# Patient Record
Sex: FEMALE | Race: WHITE
[De-identification: names, ages, dates, MRNs, and addresses within clinical notes are randomized per-mention and may not be internally consistent; named-entity substitution may affect disease eponyms.]

---

## 2017-06-07 ENCOUNTER — HOSPITAL ENCOUNTER (OUTPATIENT)
Dept: HOSPITAL 83 - CARD | Age: 67
Discharge: HOME | End: 2017-06-07
Attending: INTERNAL MEDICINE
Payer: COMMERCIAL

## 2017-06-07 DIAGNOSIS — R06.09: Primary | ICD-10-CM

## 2018-03-23 LAB
LV EF: 83 %
LVEF MODALITY: NORMAL

## 2018-06-19 ENCOUNTER — OFFICE VISIT (OUTPATIENT)
Dept: CARDIOLOGY CLINIC | Age: 68
End: 2018-06-19
Payer: MEDICARE

## 2018-06-19 VITALS
WEIGHT: 189 LBS | HEART RATE: 51 BPM | RESPIRATION RATE: 14 BRPM | BODY MASS INDEX: 29.66 KG/M2 | SYSTOLIC BLOOD PRESSURE: 120 MMHG | DIASTOLIC BLOOD PRESSURE: 68 MMHG | HEIGHT: 67 IN

## 2018-06-19 DIAGNOSIS — I49.5 SICK SINUS SYNDROME (HCC): ICD-10-CM

## 2018-06-19 DIAGNOSIS — I48.21 PERMANENT ATRIAL FIBRILLATION (HCC): Primary | ICD-10-CM

## 2018-06-19 PROCEDURE — 1036F TOBACCO NON-USER: CPT | Performed by: INTERNAL MEDICINE

## 2018-06-19 PROCEDURE — G8400 PT W/DXA NO RESULTS DOC: HCPCS | Performed by: INTERNAL MEDICINE

## 2018-06-19 PROCEDURE — G8419 CALC BMI OUT NRM PARAM NOF/U: HCPCS | Performed by: INTERNAL MEDICINE

## 2018-06-19 PROCEDURE — G8427 DOCREV CUR MEDS BY ELIG CLIN: HCPCS | Performed by: INTERNAL MEDICINE

## 2018-06-19 PROCEDURE — 1123F ACP DISCUSS/DSCN MKR DOCD: CPT | Performed by: INTERNAL MEDICINE

## 2018-06-19 PROCEDURE — 3017F COLORECTAL CA SCREEN DOC REV: CPT | Performed by: INTERNAL MEDICINE

## 2018-06-19 PROCEDURE — 93000 ELECTROCARDIOGRAM COMPLETE: CPT | Performed by: INTERNAL MEDICINE

## 2018-06-19 PROCEDURE — 99214 OFFICE O/P EST MOD 30 MIN: CPT | Performed by: INTERNAL MEDICINE

## 2018-06-19 PROCEDURE — 4040F PNEUMOC VAC/ADMIN/RCVD: CPT | Performed by: INTERNAL MEDICINE

## 2018-06-19 PROCEDURE — 1090F PRES/ABSN URINE INCON ASSESS: CPT | Performed by: INTERNAL MEDICINE

## 2018-06-19 RX ORDER — ATORVASTATIN CALCIUM 10 MG/1
10 TABLET, FILM COATED ORAL EVERY MORNING
COMMUNITY
Start: 2018-06-18 | End: 2018-12-06 | Stop reason: ALTCHOICE

## 2018-06-19 RX ORDER — OMEPRAZOLE 40 MG/1
40 CAPSULE, DELAYED RELEASE ORAL DAILY PRN
COMMUNITY
Start: 2018-06-18

## 2018-06-19 RX ORDER — VENLAFAXINE HYDROCHLORIDE 75 MG/1
75 CAPSULE, EXTENDED RELEASE ORAL EVERY MORNING
COMMUNITY
Start: 2018-06-18

## 2018-06-19 RX ORDER — CYANOCOBALAMIN 1000 UG/ML
1000 INJECTION INTRAMUSCULAR; SUBCUTANEOUS
COMMUNITY
Start: 2018-06-05

## 2018-06-19 RX ORDER — FLUOXETINE HYDROCHLORIDE 40 MG/1
40 CAPSULE ORAL EVERY MORNING
COMMUNITY
Start: 2018-06-18

## 2018-07-12 ENCOUNTER — TELEPHONE (OUTPATIENT)
Dept: ADMINISTRATIVE | Age: 68
End: 2018-07-12

## 2018-07-12 ENCOUNTER — HOSPITAL ENCOUNTER (INPATIENT)
Age: 68
LOS: 2 days | Discharge: HOME | DRG: 244 | End: 2018-07-14
Attending: INTERNAL MEDICINE | Admitting: INTERNAL MEDICINE
Payer: MEDICARE

## 2018-07-12 ENCOUNTER — APPOINTMENT (OUTPATIENT)
Dept: MRI IMAGING | Age: 68
DRG: 244 | End: 2018-07-12
Attending: INTERNAL MEDICINE
Payer: MEDICARE

## 2018-07-12 ENCOUNTER — APPOINTMENT (OUTPATIENT)
Dept: GENERAL RADIOLOGY | Age: 68
End: 2018-07-12
Payer: MEDICARE

## 2018-07-12 ENCOUNTER — HOSPITAL ENCOUNTER (OUTPATIENT)
Age: 68
Discharge: HOME | End: 2018-07-12
Payer: MEDICARE

## 2018-07-12 ENCOUNTER — HOSPITAL ENCOUNTER (EMERGENCY)
Age: 68
Discharge: ANOTHER HOSPITAL | End: 2018-07-12
Attending: EMERGENCY MEDICINE
Payer: MEDICARE

## 2018-07-12 ENCOUNTER — APPOINTMENT (OUTPATIENT)
Dept: CT IMAGING | Age: 68
End: 2018-07-12
Payer: MEDICARE

## 2018-07-12 VITALS
SYSTOLIC BLOOD PRESSURE: 142 MMHG | HEIGHT: 67 IN | TEMPERATURE: 98 F | WEIGHT: 185 LBS | BODY MASS INDEX: 29.03 KG/M2 | OXYGEN SATURATION: 97 % | HEART RATE: 42 BPM | RESPIRATION RATE: 14 BRPM | DIASTOLIC BLOOD PRESSURE: 52 MMHG

## 2018-07-12 DIAGNOSIS — I48.91 ATRIAL FIBRILLATION WITH SLOW VENTRICULAR RESPONSE (HCC): Primary | ICD-10-CM

## 2018-07-12 PROBLEM — I48.21 PERMANENT ATRIAL FIBRILLATION (HCC): Status: RESOLVED | Noted: 2018-06-19 | Resolved: 2018-07-12

## 2018-07-12 PROBLEM — R00.1 SYMPTOMATIC BRADYCARDIA: Status: ACTIVE | Noted: 2018-07-12

## 2018-07-12 PROBLEM — R55 NEAR SYNCOPE: Status: ACTIVE | Noted: 2018-07-12

## 2018-07-12 PROBLEM — I48.19 PERSISTENT ATRIAL FIBRILLATION (HCC): Status: ACTIVE | Noted: 2018-07-12

## 2018-07-12 LAB
ANION GAP SERPL CALCULATED.3IONS-SCNC: 12 MMOL/L (ref 7–16)
APTT: 40.1 SEC (ref 24.5–35.1)
BACTERIA: ABNORMAL /HPF
BILIRUBIN URINE: NEGATIVE
BLOOD, URINE: NORMAL
BUN BLDV-MCNC: 16 MG/DL (ref 8–23)
CALCIUM SERPL-MCNC: 8.8 MG/DL (ref 8.6–10.2)
CHLORIDE BLD-SCNC: 105 MMOL/L (ref 98–107)
CLARITY: CLEAR
CO2: 23 MMOL/L (ref 22–29)
COLOR: YELLOW
CREAT SERPL-MCNC: 0.8 MG/DL (ref 0.5–1)
EKG ATRIAL RATE: 53 BPM
EKG Q-T INTERVAL: 466 MS
EKG QRS DURATION: 92 MS
EKG QTC CALCULATION (BAZETT): 429 MS
EKG R AXIS: 76 DEGREES
EKG T AXIS: 57 DEGREES
EKG VENTRICULAR RATE: 51 BPM
EPITHELIAL CELLS, UA: ABNORMAL /HPF
GFR AFRICAN AMERICAN: >60
GFR NON-AFRICAN AMERICAN: >60 ML/MIN/1.73
GLUCOSE BLD-MCNC: 89 MG/DL (ref 74–109)
GLUCOSE URINE: NEGATIVE MG/DL
HCT VFR BLD CALC: 34.7 % (ref 34–48)
HEMOGLOBIN: 11 G/DL (ref 11.5–15.5)
INR BLD: 1.5
KETONES, URINE: NEGATIVE MG/DL
LEUKOCYTE ESTERASE, URINE: NEGATIVE
MCH RBC QN AUTO: 31.7 PG (ref 26–35)
MCHC RBC AUTO-ENTMCNC: 31.7 % (ref 32–34.5)
MCV RBC AUTO: 100 FL (ref 80–99.9)
NITRITE, URINE: NEGATIVE
PDW BLD-RTO: 14.1 FL (ref 11.5–15)
PH UA: 5.5 (ref 5–9)
PLATELET # BLD: 305 E9/L (ref 130–450)
PMV BLD AUTO: 9.9 FL (ref 7–12)
POTASSIUM SERPL-SCNC: 4.5 MMOL/L (ref 3.5–5)
PROTEIN UA: NEGATIVE MG/DL
PROTHROMBIN TIME: 16.9 SEC (ref 9.3–12.4)
RBC # BLD: 3.47 E12/L (ref 3.5–5.5)
RBC UA: ABNORMAL /HPF (ref 0–2)
SODIUM BLD-SCNC: 140 MMOL/L (ref 132–146)
SPECIFIC GRAVITY UA: 1.01 (ref 1–1.03)
TROPONIN: <0.01 NG/ML (ref 0–0.03)
UROBILINOGEN, URINE: 0.2 E.U./DL
WBC # BLD: 14.5 E9/L (ref 4.5–11.5)
WBC UA: ABNORMAL /HPF (ref 0–5)

## 2018-07-12 PROCEDURE — 99285 EMERGENCY DEPT VISIT HI MDM: CPT

## 2018-07-12 PROCEDURE — 80048 BASIC METABOLIC PNL TOTAL CA: CPT

## 2018-07-12 PROCEDURE — 70450 CT HEAD/BRAIN W/O DYE: CPT

## 2018-07-12 PROCEDURE — 2140000000 HC CCU INTERMEDIATE R&B

## 2018-07-12 PROCEDURE — 85027 COMPLETE CBC AUTOMATED: CPT

## 2018-07-12 PROCEDURE — 2580000003 HC RX 258: Performed by: NURSE PRACTITIONER

## 2018-07-12 PROCEDURE — 81001 URINALYSIS AUTO W/SCOPE: CPT

## 2018-07-12 PROCEDURE — 84484 ASSAY OF TROPONIN QUANT: CPT

## 2018-07-12 PROCEDURE — A0425 GROUND MILEAGE: HCPCS

## 2018-07-12 PROCEDURE — 70551 MRI BRAIN STEM W/O DYE: CPT

## 2018-07-12 PROCEDURE — 71045 X-RAY EXAM CHEST 1 VIEW: CPT

## 2018-07-12 PROCEDURE — 85610 PROTHROMBIN TIME: CPT

## 2018-07-12 PROCEDURE — 6370000000 HC RX 637 (ALT 250 FOR IP): Performed by: INTERNAL MEDICINE

## 2018-07-12 PROCEDURE — 99223 1ST HOSP IP/OBS HIGH 75: CPT | Performed by: INTERNAL MEDICINE

## 2018-07-12 PROCEDURE — 85730 THROMBOPLASTIN TIME PARTIAL: CPT

## 2018-07-12 PROCEDURE — A0426 ALS 1: HCPCS

## 2018-07-12 PROCEDURE — 93005 ELECTROCARDIOGRAM TRACING: CPT | Performed by: PHYSICIAN ASSISTANT

## 2018-07-12 RX ORDER — FLUOXETINE HYDROCHLORIDE 20 MG/1
40 CAPSULE ORAL EVERY MORNING
Status: DISCONTINUED | OUTPATIENT
Start: 2018-07-13 | End: 2018-07-14 | Stop reason: HOSPADM

## 2018-07-12 RX ORDER — ONDANSETRON 2 MG/ML
4 INJECTION INTRAMUSCULAR; INTRAVENOUS EVERY 6 HOURS PRN
Status: DISCONTINUED | OUTPATIENT
Start: 2018-07-12 | End: 2018-07-14 | Stop reason: HOSPADM

## 2018-07-12 RX ORDER — SODIUM CHLORIDE 0.9 % (FLUSH) 0.9 %
10 SYRINGE (ML) INJECTION PRN
Status: DISCONTINUED | OUTPATIENT
Start: 2018-07-12 | End: 2018-07-14 | Stop reason: HOSPADM

## 2018-07-12 RX ORDER — SODIUM CHLORIDE 0.9 % (FLUSH) 0.9 %
10 SYRINGE (ML) INJECTION EVERY 12 HOURS SCHEDULED
Status: DISCONTINUED | OUTPATIENT
Start: 2018-07-12 | End: 2018-07-14 | Stop reason: HOSPADM

## 2018-07-12 RX ORDER — ATORVASTATIN CALCIUM 10 MG/1
10 TABLET, FILM COATED ORAL EVERY MORNING
Status: DISCONTINUED | OUTPATIENT
Start: 2018-07-13 | End: 2018-07-14 | Stop reason: HOSPADM

## 2018-07-12 RX ORDER — PANTOPRAZOLE SODIUM 40 MG/1
40 TABLET, DELAYED RELEASE ORAL
Status: DISCONTINUED | OUTPATIENT
Start: 2018-07-13 | End: 2018-07-14 | Stop reason: HOSPADM

## 2018-07-12 RX ORDER — 0.9 % SODIUM CHLORIDE 0.9 %
10 VIAL (ML) INJECTION PRN
Status: DISCONTINUED | OUTPATIENT
Start: 2018-07-12 | End: 2018-07-12 | Stop reason: HOSPADM

## 2018-07-12 RX ORDER — LORAZEPAM 1 MG/1
1 TABLET ORAL EVERY MORNING
Status: DISCONTINUED | OUTPATIENT
Start: 2018-07-13 | End: 2018-07-14 | Stop reason: HOSPADM

## 2018-07-12 RX ORDER — VENLAFAXINE HYDROCHLORIDE 75 MG/1
75 CAPSULE, EXTENDED RELEASE ORAL EVERY MORNING
Status: DISCONTINUED | OUTPATIENT
Start: 2018-07-13 | End: 2018-07-14 | Stop reason: HOSPADM

## 2018-07-12 RX ORDER — SODIUM CHLORIDE 0.9 % (FLUSH) 0.9 %
SYRINGE (ML) INJECTION
Status: DISCONTINUED
Start: 2018-07-12 | End: 2018-07-12 | Stop reason: HOSPADM

## 2018-07-12 RX ORDER — PREDNISONE 20 MG/1
20 TABLET ORAL EVERY 6 HOURS
Status: DISCONTINUED | OUTPATIENT
Start: 2018-07-12 | End: 2018-07-13

## 2018-07-12 RX ORDER — LORAZEPAM 1 MG/1
1 TABLET ORAL EVERY MORNING
COMMUNITY

## 2018-07-12 RX ADMIN — Medication 10 ML: at 20:55

## 2018-07-12 RX ADMIN — PREDNISONE 20 MG: 20 TABLET ORAL at 16:06

## 2018-07-12 RX ADMIN — PREDNISONE 20 MG: 20 TABLET ORAL at 21:33

## 2018-07-12 ASSESSMENT — PAIN SCALES - GENERAL
PAINLEVEL_OUTOF10: 0
PAINLEVEL_OUTOF10: 0

## 2018-07-12 NOTE — CARE COORDINATION
Care Coordination:  Lives at home with . Is independent with ADLs, driving , etc.  No hx of DME, HHC or [facility]. She is not receptive to [facility] and feels she has no needs. No concerns regarding pharmacy. Has no questions and anticipates no needs.     Will follow    [employer] Messing

## 2018-07-12 NOTE — CONSULTS
TDI       E/Lateral E':  8.3 E/Medial E':  13.0               Pulmonary Valve       PV Peak Ron.:  0.7 m/s PV Peak Gr.:  2.1 mmHg               Tricuspid Valve       TR Peak Ron.:  2.8 m/s RAP Estimate:  8.0 mmHg          TV Max P.0 mmHg               Left Ventricle       The left ventricle is normal size. There is normal LV segmental wall        motion. There is normal left ventricular wall thickness. The left        ventricular systolic function is normal. The left ventricular        ejection fraction is within the normal range. LVEF is 65%. The left        ventricular diastolic function is normal.               Right Ventricle       The right ventricle is normal size. TAPSE 23 mm The right ventricular        systolic function is normal.          PAGE 2               Signed Report                     (CONTINUED)                                            Name: GIBSON GUARDADOCA                                            Phys: AURELIANO CARRILLO DO                                            : 1950 Age: 78      Sex: F                                            Acct: [de-identified] Loc: 405 2                                               Exam Date: 2018 Status: ADM IN                                            Radiology No: [id number]                                            Unit No: [id number]                    EXAM#     TYPE/EXAM                       RESULT                        [id number] ECHO/ECHOCARDIOGRAM COMPLETE    SEE REPORT                           <Continued>                  Atria       Left atrium is mildly dilated. Right atrium is mildly        dilated.               Aortic Valve       The Aortic valve is sclerotic. Aortic valve is trileaflet. No aortic        regurgitation is present. There is no aortic valvular stenosis.              Mitral Valve       The mitral valve is normal in structure. Mild to moderate mitral        regurgitation. There is no mitral valve stenosis.              Time of stress imagin     Date: 2018        Heart Rate at time of stress injection: 77 bpm.        Gated Stress SPECT was performed 45 minutes after stress         injection.        The images were gated to evaluate regional wall motion and calculate         left ventricular ejection fraction.                  Study Quality        Study quality was good.        Artifact: Breast artifact            PAGE 1               Signed Report                     (CONTINUED)                                             Name: JASIEL GUARDADO                                             Phys: EILEEN VELEZ,ALYSE                                             : 1950 Age: 78      Sex:  F                                             Acct: [de-identified] Loc: CARD                                                 Exam Date: 2018 Status: REG CLI                                             Radiology No: [id number]                                             Unit No: [id number]                          EXAM#     TYPE/EXAM                       RESULT                         [id number] NM/NM MYOCARDIAL PERFUSION COMP SEE REPORT                            <Continued>                     Left Ventricle        Size:         The left ventricle is normal size.         Systolic Function:         The left ventricular systolic function is normal.         Wall Motion:         Left ventricular wall motion is normal.                 Nuclear Findings        SSS: 3  SRS: 0  SDS: 3        Gated LVESV: 18        TID Score: 1.09        LVEF by Gated SPECT: 83 %                 Left Ventricle Perfusion        Normal uniformed perfusion on both the stress and resting         images.        Normal left ventricular perfusion.                 Impression        Normal pharmacologic nuclear stress test.                 Conclusion        The stress and resting images show normal perfusion.        Normal study.  No scintigraphic evidence for myocardial ischemia or         scar.                 <Conclusion>        The stress and resting images show normal perfusion.                                            PAGE 2               Signed Report                     (CONTINUED)                                             Name: JASIEL GUARDADO                                             Phys: EILEEN VELEZ,ALYSE                                             : 1950 Age: 78      Sex: Starr Yo                                        Acct: [de-identified] Loc: CARD                                                 Exam Date: 2018 Status: REG CLI                                             Radiology No: [id number]                                             Unit No: [id number]                          EXAM#     TYPE/EXAM                       RESULT                         [id number] NM/NM MYOCARDIAL PERFUSION COMP SEE REPORT                            <Continued>            Normal study.  No scintigraphic evidence for myocardial ischemia or         scar.                                         ** REPORT SIGNED IN OTHER VENDOR SYSTEM 2018 **                       Reported By: Jessica Rodriguez MD                                                                                                                CC: Jessica Rodriguez MD; Reji Dyson MD                     Technologist: Kecia Kessler   Transcribed Date/Time: 2018 (7591)        : [id number]        Printed Date/Time: 2018 (1078)         Telemetry2018: AF with SVR    ECG 2018: AF with a SVR, NAD, normal QRSd, no acute ST changes. I have independently reviewed all of the ECGs and rhythm strips per above. I have personally reviewed the laboratory, cardiac diagnostic and radiographic testing as outlined above. I have reviewed previous records noted in [address]. Impression:    1.  Symptomatic bradycardia/AF with SVR/SSS  - HRs 30s - 40s bpm with associated GU, SOB,

## 2018-07-12 NOTE — ED NOTES
Assumed care of pt. No s/s of distress. Resps easy. A&O. Cardiac/hemodynamic monitoring in place. EKG completed and given to attending. Pt states she was seen last week at [facility] (on Thursday per pt). Pt was diagnosed with AFib with SVR and sent home with a heart monitor from the hospital. Pt states she does not have any results from monitor at this time. Pt c/o jaw pain last night, numbness and slurred speech past couple of days. Pt c/o tingling in arms, legs, and facial cheeks at this time. Pt has appointment with Dr Trevor Elias tomorrow. Pt states at the end of March she was diagnosed with AFib with SVR and started on Xarelto. Visitor in room.      Adelaide Williamson RN  07/12/18 3364

## 2018-07-12 NOTE — H&P
lives Home     TOBACCO:   reports that she quit smoking about 35 years ago. She has a 0.75 pack-year smoking history. She has never used smokeless tobacco.  ETOH:   reports that she does not drink alcohol. REVIEW OF SYSTEMS:   Pertinent positives as noted in the HPI. All other systems reviewed and negative. PHYSICAL EXAM:    BP (!) 142/67   Pulse 63   Temp 97.2 °F (36.2 °C) (Temporal)   Resp 16   Ht 5' 7\" (1.702 m)   Wt 182 lb 12.8 oz (82.9 kg)   SpO2 98%   BMI 28.63 kg/m²     General appearance:  No apparent distress, appears stated age and cooperative. HEENT:  Normal cephalic, atraumatic without obvious deformity. Pupils equal, round, and reactive to light. Extra ocular muscles intact. Conjunctivae/corneas clear. Neck: Supple, with full range of motion. No jugular venous distention. Trachea midline. Respiratory:  Normal respiratory effort. Clear to auscultation, bilaterally without Rales/Wheezes/Rhonchi. Cardiovascular:  Borderline bradycardic and irregular rhythm with normal S1/S2 without murmurs, rubs or gallops. Abdomen: Soft, non-tender, non-distended with normal bowel sounds. Musculoskeletal:  No clubbing, cyanosis or edema bilaterally. Full range of motion without deformity. Skin: Skin color, texture, turgor normal.  No rashes or lesions. Neurologic:  Neurovascularly intact without any focal sensory/motor deficits. Peripheral neuropathy noted with decreased sensation to toes and feet  Psychiatric:  Alert and oriented, thought content appropriate, normal insight  Capillary Refill: Brisk,< 3 seconds   Peripheral Pulses: +2 palpable, equal bilaterally     Ct Head Wo Contrast  Result Date: 7/12/2018  1. No evidence of intracranial hemorrhage, extra axial collection, space-occupying mass lesion or mass effect, midline shift, or acute territorial infarction.  If there is strong clinical suspicion for acute infarct of the brain, then MR imaging of the brain with diffusion-weighted sequence may be obtained for further evaluation. 2. Chronic involutional changes and minimal microvascular ischemic disease as described above. Xr Chest Portable   Result Date: 7/12/2018  No gross focal consolidation. EKG:  Atrial fibrillation rate of 51 BPM     Labs:     Recent Labs      07/12/18 0922   WBC  14.5*   HGB  11.0*   HCT  34.7   PLT  305     Recent Labs      07/12/18 0922   NA  140   K  4.5   CL  105   CO2  23   BUN  16   CREATININE  0.8   CALCIUM  8.8     No results for input(s): AST, ALT, BILIDIR, BILITOT, ALKPHOS in the last 72 hours. Recent Labs      07/12/18 0922   INR  1.5     Recent Labs      07/12/18 0922   TROPONINI  <0.01       Urinalysis:      Lab Results   Component Value Date    NITRU Negative 07/12/2018    WBCUA NONE 07/12/2018    BACTERIA RARE 07/12/2018    RBCUA 1-3 07/12/2018    BLOODU TRACE-INTACT 07/12/2018    SPECGRAV 1.010 07/12/2018    GLUCOSEU Negative 07/12/2018         ASSESSMENT:    Active Hospital Problems    Diagnosis Date Noted    A-fib [facility]) [I48.91] 07/12/2018     Persistent atrial fibrillation with slow ventricular response  - Patient was diagnosed in March 2018 with atrial fibrillation by Dr. Ramya Ghosh and was placed on Xarelto at that time. She states that this was incidentally found due to coughing and some chest tightness. - Echocardiogram and stress test were done at that time. Echo showed EF of 65-70%. Stress test was negative in March 2018  - Patient was not on any rate controlling medications at home; heart rate has been low since diagnosis without meds  - Patient has wore a 48-hour monitor which is scanned into Epic; denies any syncopal events    Symptomatic bradycardia   - Patient describes lightheadedness with unsteady gait at times when she ambulates. States that she checks her heart rate at home and at times it is in the 30s to 40s. Speech changes with unsteady gait  - Will rule out stroke in setting of atrial fibrillation.   Low suspicion for

## 2018-07-12 NOTE — ED NOTES
OK to order meal tray for pt per attending. Meal tray ordered.  Fluids given to pt per pt request.     Giorgio Fox RN  07/12/18 6270

## 2018-07-13 ENCOUNTER — ANESTHESIA (OUTPATIENT)
Dept: CARDIAC CATH/INVASIVE PROCEDURES | Age: 68
DRG: 244 | End: 2018-07-13
Payer: MEDICARE

## 2018-07-13 ENCOUNTER — ANESTHESIA EVENT (OUTPATIENT)
Dept: CARDIAC CATH/INVASIVE PROCEDURES | Age: 68
DRG: 244 | End: 2018-07-13
Payer: MEDICARE

## 2018-07-13 ENCOUNTER — APPOINTMENT (OUTPATIENT)
Dept: GENERAL RADIOLOGY | Age: 68
DRG: 244 | End: 2018-07-13
Attending: INTERNAL MEDICINE
Payer: MEDICARE

## 2018-07-13 VITALS — DIASTOLIC BLOOD PRESSURE: 65 MMHG | OXYGEN SATURATION: 96 % | SYSTOLIC BLOOD PRESSURE: 108 MMHG

## 2018-07-13 PROBLEM — D46.9 MYELODYSPLASTIC SYNDROME (HCC): Chronic | Status: ACTIVE | Noted: 2018-07-13

## 2018-07-13 PROBLEM — Z87.898 HISTORY OF UNSTEADY GAIT: Chronic | Status: ACTIVE | Noted: 2018-07-13

## 2018-07-13 PROBLEM — R29.90 STROKE-LIKE SYMPTOM: Status: ACTIVE | Noted: 2018-07-13

## 2018-07-13 LAB
ANION GAP SERPL CALCULATED.3IONS-SCNC: 12 MMOL/L (ref 7–16)
BUN BLDV-MCNC: 17 MG/DL (ref 8–23)
CALCIUM SERPL-MCNC: 8.7 MG/DL (ref 8.6–10.2)
CHLORIDE BLD-SCNC: 102 MMOL/L (ref 98–107)
CO2: 25 MMOL/L (ref 22–29)
CREAT SERPL-MCNC: 0.8 MG/DL (ref 0.5–1)
GFR AFRICAN AMERICAN: >60
GFR NON-AFRICAN AMERICAN: >60 ML/MIN/1.73
GLUCOSE BLD-MCNC: 142 MG/DL (ref 74–109)
HCT VFR BLD CALC: 34 % (ref 34–48)
HEMOGLOBIN: 11.1 G/DL (ref 11.5–15.5)
MAGNESIUM: 2 MG/DL (ref 1.6–2.6)
MCH RBC QN AUTO: 32.2 PG (ref 26–35)
MCHC RBC AUTO-ENTMCNC: 32.6 % (ref 32–34.5)
MCV RBC AUTO: 98.6 FL (ref 80–99.9)
PDW BLD-RTO: 13.9 FL (ref 11.5–15)
PLATELET # BLD: 290 E9/L (ref 130–450)
PMV BLD AUTO: 9.8 FL (ref 7–12)
POTASSIUM REFLEX MAGNESIUM: 4.2 MMOL/L (ref 3.5–5)
PROCALCITONIN: 0.05 NG/ML (ref 0–0.08)
RBC # BLD: 3.45 E12/L (ref 3.5–5.5)
SODIUM BLD-SCNC: 139 MMOL/L (ref 132–146)
WBC # BLD: 18.9 E9/L (ref 4.5–11.5)

## 2018-07-13 PROCEDURE — 2720000010 HC SURG SUPPLY STERILE

## 2018-07-13 PROCEDURE — 36415 COLL VENOUS BLD VENIPUNCTURE: CPT

## 2018-07-13 PROCEDURE — 84145 PROCALCITONIN (PCT): CPT

## 2018-07-13 PROCEDURE — 02H63JZ INSERTION OF PACEMAKER LEAD INTO RIGHT ATRIUM, PERCUTANEOUS APPROACH: ICD-10-PCS | Performed by: FAMILY MEDICINE

## 2018-07-13 PROCEDURE — 2709999900 HC NON-CHARGEABLE SUPPLY

## 2018-07-13 PROCEDURE — 33208 INSRT HEART PM ATRIAL & VENT: CPT | Performed by: INTERNAL MEDICINE

## 2018-07-13 PROCEDURE — 6360000002 HC RX W HCPCS: Performed by: INTERNAL MEDICINE

## 2018-07-13 PROCEDURE — 6370000000 HC RX 637 (ALT 250 FOR IP): Performed by: INTERNAL MEDICINE

## 2018-07-13 PROCEDURE — 6360000002 HC RX W HCPCS: Performed by: NURSE ANESTHETIST, CERTIFIED REGISTERED

## 2018-07-13 PROCEDURE — 6370000000 HC RX 637 (ALT 250 FOR IP): Performed by: NURSE PRACTITIONER

## 2018-07-13 PROCEDURE — 80048 BASIC METABOLIC PNL TOTAL CA: CPT

## 2018-07-13 PROCEDURE — 6360000002 HC RX W HCPCS

## 2018-07-13 PROCEDURE — C1785 PMKR, DUAL, RATE-RESP: HCPCS

## 2018-07-13 PROCEDURE — 2580000003 HC RX 258: Performed by: INTERNAL MEDICINE

## 2018-07-13 PROCEDURE — 2140000000 HC CCU INTERMEDIATE R&B

## 2018-07-13 PROCEDURE — 85027 COMPLETE CBC AUTOMATED: CPT

## 2018-07-13 PROCEDURE — 83735 ASSAY OF MAGNESIUM: CPT

## 2018-07-13 PROCEDURE — 2500000003 HC RX 250 WO HCPCS

## 2018-07-13 PROCEDURE — 71045 X-RAY EXAM CHEST 1 VIEW: CPT

## 2018-07-13 PROCEDURE — 0JH606Z INSERTION OF PACEMAKER, DUAL CHAMBER INTO CHEST SUBCUTANEOUS TISSUE AND FASCIA, OPEN APPROACH: ICD-10-PCS | Performed by: FAMILY MEDICINE

## 2018-07-13 PROCEDURE — 02HK3JZ INSERTION OF PACEMAKER LEAD INTO RIGHT VENTRICLE, PERCUTANEOUS APPROACH: ICD-10-PCS | Performed by: FAMILY MEDICINE

## 2018-07-13 PROCEDURE — C1894 INTRO/SHEATH, NON-LASER: HCPCS

## 2018-07-13 PROCEDURE — C1898 LEAD, PMKR, OTHER THAN TRANS: HCPCS

## 2018-07-13 PROCEDURE — 2580000003 HC RX 258: Performed by: NURSE ANESTHETIST, CERTIFIED REGISTERED

## 2018-07-13 PROCEDURE — 2580000003 HC RX 258

## 2018-07-13 PROCEDURE — B507YZZ PLAIN RADIOGRAPHY OF LEFT SUBCLAVIAN VEIN USING OTHER CONTRAST: ICD-10-PCS | Performed by: FAMILY MEDICINE

## 2018-07-13 PROCEDURE — 2580000003 HC RX 258: Performed by: NURSE PRACTITIONER

## 2018-07-13 RX ORDER — FENTANYL CITRATE 50 UG/ML
INJECTION, SOLUTION INTRAMUSCULAR; INTRAVENOUS PRN
Status: DISCONTINUED | OUTPATIENT
Start: 2018-07-13 | End: 2018-07-13 | Stop reason: SDUPTHER

## 2018-07-13 RX ORDER — SODIUM CHLORIDE 9 MG/ML
INJECTION, SOLUTION INTRAVENOUS CONTINUOUS PRN
Status: DISCONTINUED | OUTPATIENT
Start: 2018-07-13 | End: 2018-07-13 | Stop reason: SDUPTHER

## 2018-07-13 RX ORDER — PROPOFOL 10 MG/ML
INJECTION, EMULSION INTRAVENOUS CONTINUOUS PRN
Status: DISCONTINUED | OUTPATIENT
Start: 2018-07-13 | End: 2018-07-13 | Stop reason: SDUPTHER

## 2018-07-13 RX ORDER — ACETAMINOPHEN 325 MG/1
650 TABLET ORAL EVERY 4 HOURS PRN
Status: DISCONTINUED | OUTPATIENT
Start: 2018-07-13 | End: 2018-07-14 | Stop reason: HOSPADM

## 2018-07-13 RX ORDER — SODIUM CHLORIDE 0.9 % (FLUSH) 0.9 %
10 SYRINGE (ML) INJECTION PRN
Status: DISCONTINUED | OUTPATIENT
Start: 2018-07-13 | End: 2018-07-14 | Stop reason: HOSPADM

## 2018-07-13 RX ORDER — SODIUM CHLORIDE 0.9 % (FLUSH) 0.9 %
10 SYRINGE (ML) INJECTION EVERY 12 HOURS SCHEDULED
Status: DISCONTINUED | OUTPATIENT
Start: 2018-07-13 | End: 2018-07-14 | Stop reason: HOSPADM

## 2018-07-13 RX ORDER — HYDROCODONE BITARTRATE AND ACETAMINOPHEN 5; 325 MG/1; MG/1
2 TABLET ORAL EVERY 4 HOURS PRN
Status: DISCONTINUED | OUTPATIENT
Start: 2018-07-13 | End: 2018-07-14 | Stop reason: HOSPADM

## 2018-07-13 RX ORDER — MIDAZOLAM HYDROCHLORIDE 1 MG/ML
INJECTION INTRAMUSCULAR; INTRAVENOUS PRN
Status: DISCONTINUED | OUTPATIENT
Start: 2018-07-13 | End: 2018-07-13 | Stop reason: SDUPTHER

## 2018-07-13 RX ORDER — HYDROCODONE BITARTRATE AND ACETAMINOPHEN 5; 325 MG/1; MG/1
1 TABLET ORAL EVERY 4 HOURS PRN
Status: DISCONTINUED | OUTPATIENT
Start: 2018-07-13 | End: 2018-07-14 | Stop reason: HOSPADM

## 2018-07-13 RX ADMIN — FENTANYL CITRATE 50 MCG: 50 INJECTION, SOLUTION INTRAMUSCULAR; INTRAVENOUS at 10:52

## 2018-07-13 RX ADMIN — HYDROCODONE BITARTRATE AND ACETAMINOPHEN 1 TABLET: 5; 325 TABLET ORAL at 13:59

## 2018-07-13 RX ADMIN — HYDROCODONE BITARTRATE AND ACETAMINOPHEN 1 TABLET: 5; 325 TABLET ORAL at 19:00

## 2018-07-13 RX ADMIN — PROPOFOL 50 MCG/KG/MIN: 10 INJECTION, EMULSION INTRAVENOUS at 10:52

## 2018-07-13 RX ADMIN — Medication 10 ML: at 13:54

## 2018-07-13 RX ADMIN — FENTANYL CITRATE 25 MCG: 50 INJECTION, SOLUTION INTRAMUSCULAR; INTRAVENOUS at 11:05

## 2018-07-13 RX ADMIN — SODIUM CHLORIDE: 9 INJECTION, SOLUTION INTRAVENOUS at 10:42

## 2018-07-13 RX ADMIN — VANCOMYCIN HYDROCHLORIDE 1500 MG: 10 INJECTION, POWDER, LYOPHILIZED, FOR SOLUTION INTRAVENOUS at 22:43

## 2018-07-13 RX ADMIN — PREDNISONE 20 MG: 20 TABLET ORAL at 03:53

## 2018-07-13 RX ADMIN — MIDAZOLAM HYDROCHLORIDE 2 MG: 1 INJECTION, SOLUTION INTRAMUSCULAR; INTRAVENOUS at 10:52

## 2018-07-13 RX ADMIN — FLUOXETINE HYDROCHLORIDE 40 MG: 20 CAPSULE ORAL at 13:53

## 2018-07-13 RX ADMIN — ATORVASTATIN CALCIUM 10 MG: 10 TABLET, FILM COATED ORAL at 09:44

## 2018-07-13 RX ADMIN — FENTANYL CITRATE 25 MCG: 50 INJECTION, SOLUTION INTRAMUSCULAR; INTRAVENOUS at 11:15

## 2018-07-13 RX ADMIN — VENLAFAXINE HYDROCHLORIDE 75 MG: 75 CAPSULE, EXTENDED RELEASE ORAL at 13:54

## 2018-07-13 RX ADMIN — PANTOPRAZOLE SODIUM 40 MG: 40 TABLET, DELAYED RELEASE ORAL at 05:50

## 2018-07-13 RX ADMIN — VANCOMYCIN HYDROCHLORIDE 1 G: 1 INJECTION, POWDER, LYOPHILIZED, FOR SOLUTION INTRAVENOUS at 10:45

## 2018-07-13 RX ADMIN — Medication 10 ML: at 20:33

## 2018-07-13 RX ADMIN — PREDNISONE 20 MG: 20 TABLET ORAL at 09:44

## 2018-07-13 RX ADMIN — HYDROCODONE BITARTRATE AND ACETAMINOPHEN 1 TABLET: 5; 325 TABLET ORAL at 23:38

## 2018-07-13 ASSESSMENT — PULMONARY FUNCTION TESTS
PIF_VALUE: 1
PIF_VALUE: 2
PIF_VALUE: 1
PIF_VALUE: 2
PIF_VALUE: 1
PIF_VALUE: 2
PIF_VALUE: 1
PIF_VALUE: 1
PIF_VALUE: 2
PIF_VALUE: 1
PIF_VALUE: 2
PIF_VALUE: 1
PIF_VALUE: 2
PIF_VALUE: 1
PIF_VALUE: 2
PIF_VALUE: 2
PIF_VALUE: 1
PIF_VALUE: 1
PIF_VALUE: 2
PIF_VALUE: 1
PIF_VALUE: 1
PIF_VALUE: 2
PIF_VALUE: 1
PIF_VALUE: 2
PIF_VALUE: 1
PIF_VALUE: 2
PIF_VALUE: 1
PIF_VALUE: 2
PIF_VALUE: 1
PIF_VALUE: 2
PIF_VALUE: 2
PIF_VALUE: 1
PIF_VALUE: 2
PIF_VALUE: 1
PIF_VALUE: 2
PIF_VALUE: 1
PIF_VALUE: 2
PIF_VALUE: 1
PIF_VALUE: 1
PIF_VALUE: 2
PIF_VALUE: 1
PIF_VALUE: 1
PIF_VALUE: 2

## 2018-07-13 ASSESSMENT — PAIN DESCRIPTION - LOCATION
LOCATION: CHEST
LOCATION: CHEST

## 2018-07-13 ASSESSMENT — PAIN DESCRIPTION - ONSET
ONSET: GRADUAL
ONSET: PROGRESSIVE

## 2018-07-13 ASSESSMENT — PAIN SCALES - GENERAL
PAINLEVEL_OUTOF10: 4
PAINLEVEL_OUTOF10: 0

## 2018-07-13 ASSESSMENT — ENCOUNTER SYMPTOMS: SHORTNESS OF BREATH: 1

## 2018-07-13 ASSESSMENT — PAIN DESCRIPTION - PROGRESSION: CLINICAL_PROGRESSION: GRADUALLY WORSENING

## 2018-07-13 ASSESSMENT — PAIN DESCRIPTION - PAIN TYPE
TYPE: ACUTE PAIN
TYPE: ACUTE PAIN

## 2018-07-13 ASSESSMENT — PAIN DESCRIPTION - FREQUENCY: FREQUENCY: INTERMITTENT

## 2018-07-13 ASSESSMENT — PAIN DESCRIPTION - DESCRIPTORS
DESCRIPTORS: ACHING;DISCOMFORT;SORE
DESCRIPTORS: ACHING;DISCOMFORT;SORE

## 2018-07-13 ASSESSMENT — PAIN DESCRIPTION - ORIENTATION
ORIENTATION: LEFT
ORIENTATION: LEFT

## 2018-07-13 NOTE — PROGRESS NOTES
1010 ml   Output             2775 ml   Net            -1765 ml       Labs:   Recent Labs      07/12/18   0922  07/13/18   0523   WBC  14.5*  18.9*   HGB  11.0*  11.1*   HCT  34.7  34.0   PLT  305  290       Recent Labs      07/12/18   0922  07/13/18   0523   NA  140  139   K  4.5  4.2   CL  105  102   CO2  23  25   BUN  16  17   CREATININE  0.8  0.8   CALCIUM  8.8  8.7       No results for input(s): PROT, ALB, ALKPHOS, ALT, AST, BILITOT, AMYLASE, LIPASE in the last 72 hours. Recent Labs      07/12/18   0922   INR  1.5       Recent Labs      07/12/18 0922   TROPONINI  <0.01       Chronic labs:  Lab Results   Component Value Date    INR 1.5 07/12/2018    LABA1C <3.5 (L) 03/16/2018       Radiology:  Imaging studies reviewed today. ASSESSMENT:    Principal Problem:    Atrial fibrillation with slow ventricular response (HCC)  Active Problems:    Persistent atrial fibrillation (HCC)    Symptomatic bradycardia    Near syncope    Myelodysplastic syndrome (HCC)    Stroke-like symptom    History of unsteady gait  Resolved Problems:    A-fib (HCC)      PLAN:    MRI brain with chronic changes. No acute changes were found. Stroke is no more suspect. Pacemaker placement per EP planned for later today. Continue to cardiac monitored bed. Plans for outpatient transesophageal echocardiogram with cardioversion future. Resume Xarelto when okay with the EP post pacemaker insertion. Anticipate discharge tomorrow if cleared by consultants.     Diet: Diet NPO, After Midnight Exceptions are: Sips with Meds  Code Status: Full Code     PT/OT Eval Status: [] Ordered [] Evaluation noted [x] Not applicable  DVT Prophylaxis: []Lovenox []Heparin []PCD [x] [address] []Encouraged ambulation []Not applicable  Recommended disposition at discharge:  [x]Home [] Home with [facility] [] SNF/[facility] [] Acute Rehab [] LTAC []Other:    +++++++++++++++++++++++++++++++++++++++++++++++++  Lyn Harden MD, Hospitalist  +++++++++++++++++++++++++++++++++++++++++++++++++  NOTE: This report was transcribed using voice recognition software.  Every effort was made to ensure accuracy; however, inadvertent computerized transcription errors may be present.

## 2018-07-13 NOTE — ANESTHESIA PRE PROCEDURE
Oral QAM Debbrah Siad, APRN - CNP   Stopped at 07/13/18 6329    LORazepam (ATIVAN) tablet 1 mg  1 mg Oral QAM Debbrah Siad, APRN - CNP   Stopped at 07/13/18 1263    pantoprazole (PROTONIX) tablet 40 mg  40 mg Oral QAM AC Debbrah Siad, APRN - CNP   40 mg at 07/13/18 0550    venlafaxine (EFFEXOR XR) extended release capsule 75 mg  75 mg Oral QAM Debbrah Siad, APRN - CNP   Stopped at 07/13/18 3904    sodium chloride flush 0.9 % injection 10 mL  10 mL Intravenous 2 times per day Debbrah Siad, APRN - CNP   10 mL at 07/12/18 2055    sodium chloride flush 0.9 % injection 10 mL  10 mL Intravenous PRN Debbrah Siad, APRN - CNP        magnesium hydroxide (MILK OF MAGNESIA) 400 MG/5ML suspension 30 mL  30 mL Oral Daily PRN Debbrah Siad, APRN - CNP        ondansetron [facility]) injection 4 mg  4 mg Intravenous Q6H PRN Debbrah Siad, APRN - CNP        vancomycin 1000 mg IVPB in 250 mL D5W addavial  1,000 mg Intravenous See Admin Instructions Marcos Vargas DO        predniSONE (DELTASONE) tablet 20 mg  20 mg Oral Q6H Marcos Vargas DO   20 mg at 07/13/18 0944       Allergies:     Allergies   Allergen Reactions    Iv Dye [Iodides]     Pcn [Penicillins]        Problem List:    Patient Active Problem List   Diagnosis Code    Sick sinus syndrome (HCC) I49.5    Persistent atrial fibrillation (HCC) I48.1    Atrial fibrillation with slow ventricular response (HCC) I48.91    Symptomatic bradycardia R00.1    Near syncope R55       Past Medical History:        Diagnosis Date    Anemia     Anxiety     Atrial fibrillation (HCC)     Bradycardia     Chest pain     Depression     Dyspnea     GERD (gastroesophageal reflux disease)     Lung nodule     Transaminitis        Past Surgical History:        Procedure Laterality Date    ABDOMEN SURGERY      CHOLECYSTECTOMY      COLONOSCOPY      COSMETIC SURGERY      ENDOSCOPY, COLON, DIAGNOSTIC         Social History:    Social History   Substance Use Topics    Smoking status: Former Smoker     Packs/day: 0.25     Years: 3.00     Quit date: 7/12/1983    Smokeless tobacco: Never Used    Alcohol use No                                Counseling given: Not Answered      Vital Signs (Current):   Vitals:    07/13/18 0006 07/13/18 0439 07/13/18 0930 07/13/18 1003   BP: (!) 125/58   124/67   Pulse: (!) 48  54 52   Resp: 18  16 16   Temp: 36.9 °C (98.5 °F)  36.4 °C (97.6 °F) 36.3 °C (97.4 °F)   TempSrc: Oral  Temporal Temporal   SpO2: 95%  97%    Weight:  183 lb 11.2 oz (83.3 kg)  183 lb (83 kg)   Height:    5' 7\" (1.702 m)                                              BP Readings from Last 3 Encounters:   07/13/18 124/67   07/12/18 (!) 142/52   06/19/18 120/68       NPO Status:  7/12/18 2300                                                                               BMI:   Wt Readings from Last 3 Encounters:   07/13/18 183 lb (83 kg)   07/12/18 185 lb (83.9 kg)   06/19/18 189 lb (85.7 kg)     Body mass index is 28.66 kg/m². CBC:   Lab Results   Component Value Date    WBC 18.9 07/13/2018    RBC 3.45 07/13/2018    HGB 11.1 07/13/2018    HCT 34.0 07/13/2018    MCV 98.6 07/13/2018    RDW 13.9 07/13/2018     07/13/2018       CMP:   Lab Results   Component Value Date     07/13/2018    K 4.2 07/13/2018     07/13/2018    CO2 25 07/13/2018    BUN 17 07/13/2018    CREATININE 0.8 07/13/2018    GFRAA >60 07/13/2018    LABGLOM >60 07/13/2018    GLUCOSE 142 07/13/2018    CALCIUM 8.7 07/13/2018       POC Tests: No results for input(s): POCGLU, POCNA, POCK, POCCL, POCBUN, POCHEMO, POCHCT in the last 72 hours.     Coags:   Lab Results   Component Value Date    PROTIME 16.9 07/12/2018    INR 1.5 07/12/2018    APTT 40.1 07/12/2018       HCG (If Applicable): No results found for: PREGTESTUR, PREGSERUM, HCG, HCGQUANT     ABGs: No results found for: PHART, PO2ART, TCB3EDW, VKD7GLH, BEART, [id number]     Type & Screen (If Applicable):  No results found for: LABABO, [address]    Anesthesia

## 2018-07-13 NOTE — PLAN OF CARE
Problem: Falls - Risk of:  Goal: Will remain free from falls  Will remain free from falls   Outcome: Met This Shift  Will remain free from falls

## 2018-07-14 VITALS
HEIGHT: 67 IN | SYSTOLIC BLOOD PRESSURE: 118 MMHG | DIASTOLIC BLOOD PRESSURE: 60 MMHG | BODY MASS INDEX: 29.1 KG/M2 | HEART RATE: 94 BPM | OXYGEN SATURATION: 97 % | WEIGHT: 185.38 LBS | RESPIRATION RATE: 16 BRPM | TEMPERATURE: 97.8 F

## 2018-07-14 PROCEDURE — 6370000000 HC RX 637 (ALT 250 FOR IP): Performed by: NURSE PRACTITIONER

## 2018-07-14 PROCEDURE — 6370000000 HC RX 637 (ALT 250 FOR IP): Performed by: INTERNAL MEDICINE

## 2018-07-14 PROCEDURE — 99024 POSTOP FOLLOW-UP VISIT: CPT | Performed by: INTERNAL MEDICINE

## 2018-07-14 PROCEDURE — 2580000003 HC RX 258: Performed by: NURSE PRACTITIONER

## 2018-07-14 RX ADMIN — ATORVASTATIN CALCIUM 10 MG: 10 TABLET, FILM COATED ORAL at 08:16

## 2018-07-14 RX ADMIN — FLUOXETINE HYDROCHLORIDE 40 MG: 20 CAPSULE ORAL at 08:16

## 2018-07-14 RX ADMIN — PANTOPRAZOLE SODIUM 40 MG: 40 TABLET, DELAYED RELEASE ORAL at 05:58

## 2018-07-14 RX ADMIN — VENLAFAXINE HYDROCHLORIDE 75 MG: 75 CAPSULE, EXTENDED RELEASE ORAL at 08:16

## 2018-07-14 RX ADMIN — HYDROCODONE BITARTRATE AND ACETAMINOPHEN 1 TABLET: 5; 325 TABLET ORAL at 11:32

## 2018-07-14 RX ADMIN — Medication 10 ML: at 08:17

## 2018-07-14 RX ADMIN — HYDROCODONE BITARTRATE AND ACETAMINOPHEN 1 TABLET: 5; 325 TABLET ORAL at 05:57

## 2018-07-14 ASSESSMENT — PAIN DESCRIPTION - ONSET: ONSET: GRADUAL

## 2018-07-14 ASSESSMENT — PAIN SCALES - GENERAL
PAINLEVEL_OUTOF10: 0
PAINLEVEL_OUTOF10: 4
PAINLEVEL_OUTOF10: 5
PAINLEVEL_OUTOF10: 0

## 2018-07-14 ASSESSMENT — PAIN DESCRIPTION - PAIN TYPE
TYPE: SURGICAL PAIN
TYPE: ACUTE PAIN

## 2018-07-14 ASSESSMENT — PAIN DESCRIPTION - ORIENTATION
ORIENTATION: LEFT
ORIENTATION: LEFT

## 2018-07-14 ASSESSMENT — PAIN DESCRIPTION - DESCRIPTORS
DESCRIPTORS: ACHING;DISCOMFORT
DESCRIPTORS: ACHING;DISCOMFORT

## 2018-07-14 ASSESSMENT — PAIN DESCRIPTION - LOCATION
LOCATION: CHEST;INCISION
LOCATION: CHEST

## 2018-07-14 ASSESSMENT — PAIN DESCRIPTION - PROGRESSION: CLINICAL_PROGRESSION: GRADUALLY WORSENING

## 2018-07-14 ASSESSMENT — PAIN DESCRIPTION - FREQUENCY
FREQUENCY: INTERMITTENT
FREQUENCY: INTERMITTENT

## 2018-07-14 NOTE — DISCHARGE SUMMARY
Hospital Medicine Discharge Summary    Patient ID: Peri oDe   Patient's PCP: Marily Nicole MD, MD    Admit Date: 7/12/2018   Discharge Date:      Admitting Physician: Rob Reyes MD  Discharge Physician: Shirley Almeida MD     Discharge Diagnoses:    Principal Problem:    Atrial fibrillation with slow ventricular response ([address]  Active Problems:    Persistent atrial fibrillation (HCC)    Symptomatic bradycardia    Near syncope    Myelodysplastic syndrome (HCC)    Stroke-like symptom    History of unsteady gait  Resolved Problems:    * No resolved hospital problems. [facility] [place] [facility] course in brief (Please refer to daily progress notes for a comprehensive review of the hospitalization by requesting medical records)  79-year-old female with A. fib presents for bradycardia and symptomatic. Letter physiology consultation was sought with resultant pacemaker insertion on 7/13/2018. Patient did well. Pacemaker interrogation was deemed normal. Patient was discharged in stable condition. Ramandeep Fruits was resumed at discharge. Follow-up was discussed with EP for planned transesophageal echocardiogram and cardioversion as outpatient. Office follow-up recommended at 10-14 days. Consults:   IP CONSULT TO ELECTROPHYSIOLOGY    Discharge Instructions / Follow up:  EP follow up    Activity: activity as tolerated    Significant labs:    Labs: For convenience and continuity at follow-up the following most recent labs are provided:  CBC:   Recent Labs      07/12/18   0922 07/13/18   0523   WBC  14.5*  18.9*   RBC  3.47*  3.45*   HGB  11.0*  11.1*   HCT  34.7  34.0   MCV  100.0*  98.6   RDW  14.1  13.9   PLT  305  290     BMP:   Recent Labs      07/12/18   0922  07/13/18   0523   NA  140  139   K  4.5  4.2   CL  105  102   CO2  23  25   BUN  16  17   CREATININE  0.8  0.8   MG   --   2.0     LFT:  No results for input(s): PROT, ALB, ALKPHOS, ALT, AST, BILITOT, AMYLASE, LIPASE in the last 72 hours.   PT/INR:   Recent Labs prominently about the frontal horns of lateral ventricles bilaterally, with extension into the corona radiata and centrum semiovale, most compatible with minimal microangiopathic ischemic changes. No focal mass lesion or midline shift is identified. There is no depressed skull fracture. No lytic or blastic osseous lesions are seen. There is no significant mucosal thickening or fluid levels within paranasal sinuses or mastoid air cells. 1. No evidence of intracranial hemorrhage, extra axial collection, space-occupying mass lesion or mass effect, midline shift, or acute territorial infarction. If there is strong clinical suspicion for acute infarct of the brain, then MR imaging of the brain with diffusion-weighted sequence may be obtained for further evaluation. 2. Chronic involutional changes and minimal microvascular ischemic disease as described above. Xr Chest Portable    Result Date: 2018  Patient MRN:  [id number] : 1950 Age: 78 years Gender: Female Order Date:  2018 12:30 PM EXAM: XR CHEST PORTABLE NUMBER OF IMAGES:  1 INDICATION:  s/p pacemaker implantation s/p pacemaker implantation COMPARISON: 2018 9:23 AM FINDINGS:  Normal heart size. Interval placement of pacer with leads overlying the region of the right atrium right ventricle. No focal consolidation, pneumothorax or pleural effusion. No acute osseous changes. Degenerative changes of the spine. 1. Interval placement of pacer. Lateral view can best assess lead position. No appreciable pneumothorax. Xr Chest Portable    Result Date: 2018  Patient MRN:  [id number] : 1950 Age: 78 years Gender: Female Order Date:  2018 9:15 AM EXAM: XR CHEST PORTABLE NUMBER OF IMAGES:  1 INDICATION: Chest pain Technique: AP view of the chest obtained portably on 2018 9:15 AM Comparison:  No prior studies available for comparison. Findings: The cardiomediastinal silhouette is within normal limits.  The lungs are without

## 2018-07-14 NOTE — PROGRESS NOTES
Hospital Medicine Progress Note      Date of Admission: 7/12/2018  Chief Complaint:  Lightheadedness, numbness and tingling of extremities  Primary diagnoses: Atrial fibrillation with slow ventricular response ([address]    Subjective    Admitted for bradycardia. Plans for pacemaker placement by the electrophysiology later today. Patient is doing well this morning. Patient seen and bleeding to the bathroom and back. Patient has no symptoms of lightheadedness, palpitations. Gait is steady. Exam:    /60   Pulse 80   Temp 97.5 °F (36.4 °C) (Temporal)   Resp 14   Ht 5' 7\" (1.702 m)   Wt 185 lb 6 oz (84.1 kg)   SpO2 97%   BMI 29.03 kg/m²     General appearance: No apparent distress, appears stated age and cooperative. HEENT: Pupils equal, round, and reactive to light. Conjunctivae/corneas clear. Neck: Supple. No jugular venous distention. Trachea midline. Respiratory:  Normal respiratory effort. Clear to auscultation, bilaterally without Rales/Wheezes/Rhonchi. Cardiovascular: Bradycardic and irregular rhythm with normal S1/S2 without murmurs, rubs or gallops. Abdomen: Soft, non-tender, non-distended with normal bowel sounds. Musculoskeletal: No clubbing, cyanosis or edema bilaterally. Brisk capillary refill. 2+ lower extremity pulses (dorsalis pedis).    Skin:  No rashes    Neurologic: awake, alert and following commands     Medications:  Reviewed    Infusion Medications   Scheduled Medications    rivaroxaban  20 mg Oral Daily    sodium chloride flush  10 mL Intravenous 2 times per day    atorvastatin  10 mg Oral QAM    FLUoxetine  40 mg Oral QAM    LORazepam  1 mg Oral QAM    pantoprazole  40 mg Oral QAM AC    venlafaxine  75 mg Oral QAM    sodium chloride flush  10 mL Intravenous 2 times per day     PRN Meds: sodium chloride flush, acetaminophen, HYDROcodone 5 mg - acetaminophen **OR** HYDROcodone 5 mg - acetaminophen, sodium chloride flush, magnesium hydroxide, ondansetron    I/O    Intake/Output Summary (Last 24 hours) at 07/14/18 1005  Last data filed at 07/14/18 0556   Gross per 24 hour   Intake             1010 ml   Output              300 ml   Net              710 ml       Labs:   Recent Labs      07/12/18 0922 07/13/18 0523   WBC  14.5*  18.9*   HGB  11.0*  11.1*   HCT  34.7  34.0   PLT  305  290       Recent Labs      07/12/18   0922  07/13/18   0523   NA  140  139   K  4.5  4.2   CL  105  102   CO2  23  25   BUN  16  17   CREATININE  0.8  0.8   CALCIUM  8.8  8.7       No results for input(s): PROT, ALB, ALKPHOS, ALT, AST, BILITOT, AMYLASE, LIPASE in the last 72 hours. Recent Labs      07/12/18 0922   INR  1.5       Recent Labs      07/12/18 0922   TROPONINI  <0.01       Chronic labs:  Lab Results   Component Value Date    INR 1.5 07/12/2018    LABA1C <3.5 (L) 03/16/2018       Radiology:  Imaging studies reviewed today. ASSESSMENT:    Principal Problem:    Atrial fibrillation with slow ventricular response (HCC)  Active Problems:    Persistent atrial fibrillation (HCC)    Symptomatic bradycardia    Near syncope    Myelodysplastic syndrome (HCC)    Stroke-like symptom    History of unsteady gait  Resolved Problems:    * No resolved hospital problems. *      PLAN:  Pacer placed yesterday, interrogation pending. MRI brain with chronic changes. No acute changes were found. Stroke is no more suspect. Continue to cardiac monitored bed. Plans for outpatient transesophageal echocardiogram with cardioversion future.   Resume Xarelto prior to discharge  Anticipate discharge today if interrogation ok      Diet: DIET GENERAL;  Code Status: Full Code     PT/OT Eval Status: [] Ordered [] Evaluation noted [x] Not applicable  DVT Prophylaxis: []Lovenox []Heparin []PCD [x] [address] []Encouraged ambulation []Not applicable  Recommended disposition at discharge:  [x]Home [] Home with [facility] [] SNF/[facility] [] Acute Rehab [] LTAC []Other:    +++++++++++++++++++++++++++++++++++++++++++++++++  Marcus Murrieta MD, Hospitalist  +++++++++++++++++++++++++++++++++++++++++++++++++  NOTE: This report was transcribed using voice recognition software.  Every effort was made to ensure accuracy; however, inadvertent computerized transcription errors may be present.

## 2018-07-14 NOTE — PROGRESS NOTES
Cardiac Electrophysiology Inpatient Progress Note    Sonia Espinoza  1950  Date of Service: 7/14/2018  PCP: Kim Briggs MD, MD  Cardiologist: Eliu Muhammad MD   Electrophysiologist: Johnny Hoff DO        Subjective: Sonia Espinoza is seen in hospital follow-up and management of: POD #1 status post [address] dual chamber pacemaker implantation, symptomatic bradycardia, and atrial fibrillation with a slow ventricular response. Overnight she offers no complaints. She denies any chest pain, shortness of breath, orthopnea or PND. Her pacemaker site is stable.     Patient Active Problem List   Diagnosis    Sick sinus syndrome (HCC)    Persistent atrial fibrillation (HCC)    Atrial fibrillation with slow ventricular response (HCC)    Symptomatic bradycardia    Near syncope    Myelodysplastic syndrome (HCC)    Stroke-like symptom    History of unsteady gait         Scheduled Medications:   sodium chloride flush  10 mL Intravenous 2 times per day    atorvastatin  10 mg Oral QAM    FLUoxetine  40 mg Oral QAM    LORazepam  1 mg Oral QAM    pantoprazole  40 mg Oral QAM AC    venlafaxine  75 mg Oral QAM    sodium chloride flush  10 mL Intravenous 2 times per day       PRN Medications:  sodium chloride flush, acetaminophen, HYDROcodone 5 mg - acetaminophen **OR** HYDROcodone 5 mg - acetaminophen, sodium chloride flush, magnesium hydroxide, ondansetron    Allergies   Allergen Reactions    Iv Dye [Iodides]     Pcn [Penicillins]        Wt Readings from Last 3 Encounters:   07/14/18 185 lb 6 oz (84.1 kg)   07/12/18 185 lb (83.9 kg)   06/19/18 189 lb (85.7 kg)     Temp Readings from Last 3 Encounters:   07/14/18 97.5 °F (36.4 °C) (Temporal)   07/12/18 98 °F (36.7 °C) (Oral)     BP Readings from Last 3 Encounters:   07/14/18 110/60   07/13/18 108/65   07/12/18 (!) 142/52     Pulse Readings from Last 3 Encounters:   07/14/18 80   07/12/18 (!) 42   06/19/18 51         Intake/Output Summary (Last 24 hours) at 07/14/18 0828  Last data filed at 07/14/18 0556   Gross per 24 hour   Intake             1010 ml   Output              300 ml   Net              710 ml       ROS:   Constitutional: Negative for fever, activity change and appetite change. HENT: Negative for epistaxis, difficulty swallowing. Eyes: Negative for blurred vision or double vision. Respiratory: Negative for cough, chest tightness, shortness of breath and wheezing. Cardiovascular: Negative for chest pain, palpitations and leg swelling. Gastrointestinal: Negative for abdominal pain, heartburn, or blood in stool. Genitourinary: Negative for hematuria, burning or frequency. Musculoskeletal: Negative for myalgias, stiffness, or swelling. Skin: Negative for rash, pain, or lumps. Neurological: Negative for syncope, seizures, or headaches. Psychiatric/Behavioral: Negative for confusion and agitation. The patient is not nervous/anxious. PHYSICAL EXAM:  Vitals:    07/14/18 0008 07/14/18 0400 07/14/18 0545 07/14/18 0808   BP:  129/81  110/60   Pulse:  69  80   Resp:  16  14   Temp:  98.1 °F (36.7 °C)  97.5 °F (36.4 °C)   TempSrc:  Oral  Temporal   SpO2: 96%   97%   Weight:   185 lb 6 oz (84.1 kg)    Height:         Constitutional: Oriented to person, place, and time. Well-developed and cooperative. Head: Normocephalic and atraumatic. Eyes: Conjunctivae are normal. Pupils are equal, round, and reactive to light. Neck: No hepatojugular reflux and no JVD present. Carotid bruit is not present. Cardiovascular: S1 normal, S2 normal and intact distal pulses. A regular rhythm present. PMI is not displaced. Pulmonary/Chest: Effort normal and breath sounds normal. No respiratory distress. Abdominal: Soft. Normal appearance and bowel sounds are normal.  No abdominal bruit and no pulsatile midline mass. There is no hepatomegaly. No tenderness.    Musculoskeletal: Normal range of motion of all extremities, no muscle weakness. Neurological: Alert and oriented to person, place, and time. Gait normal.   Skin: Skin is warm and dry. No bruising, no ecchymosis and no rash noted. Extremity: No clubbing or cyanosis. No edema. Psychiatric: Normal mood and affect. Thought content normal.   Pacemaker site: No sign of hematoma or drainage. Aquacel dressing intact. Pertinent Labs:  CBC:   Recent Labs      18   WBC  14.5*  18.9*   HGB  11.0*  11.1*   HCT  34.7  34.0   PLT  305  290     BMP:  Recent Labs      18   NA  140  139   K  4.5  4.2   CL  105  102   CO2  23  25   BUN  16  17   CREATININE  0.8  0.8   CALCIUM  8.8  8.7     ABGs: No results found for: PHART, PO2ART, RAL4ZQX  INR:   Recent Labs      18   INR  1.5     BNP: No results for input(s): BNP in the last 72 hours. TSH: No results found for: TSH   Cardiac Injury Profile: Recent Labs      18   TROPONINI  <0.01      Lipid Profile: No results found for: TRIG, HDL, LDLCALC, CHOL   Hemoglobin A1C: No components found for: HGBA1C   Xray: Ct Head Wo Contrast    Result Date: 2018  Patient MRN:  [id number] : 1950 Age: 78 years Gender: Female Order Date:  2018 9:15 AM EXAM: CT HEAD WO CONTRAST NUMBER OF IMAGES:  152 INDICATION: Dizziness Technique: Multiple contiguous 5 mm axial images were obtained from the skull base to the vertex without administration of IV contrast. Reformatted images were generated in the sagittal and coronal planes. Images were reviewed in brain, subdural, soft tissue and bone windows. Low-dose CT  acquisition technique included one of following options: 1. Automated exposure control 2. Adjustment of MA and or KV according to patient's size or 3. Use of iterative reconstruction. Comparison: No prior studies available for comparison. Findings:  There is minimal cortical sulcal prominence and minimal ventricular dilatation, with no clear-cut evidence of hydrocephalus, consistent with minimal cerebral volume loss. The basal cisterns are preserved. The fourth ventricle is midline. The supratentorial midline structures appear unremarkable. No evidence of parenchymal hemorrhage or extra-axial fluid collection is identified. There is no evidence of cortical infarction or contusion, with preservation of gray-white matter distinction. There are patchy regions of low attenuation in the deep periventricular white matter, most prominently about the frontal horns of lateral ventricles bilaterally, with extension into the corona radiata and centrum semiovale, most compatible with minimal microangiopathic ischemic changes. No focal mass lesion or midline shift is identified. There is no depressed skull fracture. No lytic or blastic osseous lesions are seen. There is no significant mucosal thickening or fluid levels within paranasal sinuses or mastoid air cells. 1. No evidence of intracranial hemorrhage, extra axial collection, space-occupying mass lesion or mass effect, midline shift, or acute territorial infarction. If there is strong clinical suspicion for acute infarct of the brain, then MR imaging of the brain with diffusion-weighted sequence may be obtained for further evaluation. 2. Chronic involutional changes and minimal microvascular ischemic disease as described above. Xr Chest Portable    Result Date: 2018  Patient MRN:  [id number] : 1950 Age: 78 years Gender: Female Order Date:  2018 12:30 PM EXAM: XR CHEST PORTABLE NUMBER OF IMAGES:  1 INDICATION:  s/p pacemaker implantation s/p pacemaker implantation COMPARISON: 2018 9:23 AM FINDINGS:  Normal heart size. Interval placement of pacer with leads overlying the region of the right atrium right ventricle. No focal consolidation, pneumothorax or pleural effusion. No acute osseous changes. Degenerative changes of the spine. 1. Interval placement of pacer.  Lateral view can best assess lead position. No appreciable pneumothorax. Xr Chest Portable    Result Date: 2018  Patient MRN:  [id number] : 1950 Age: 78 years Gender: Female Order Date:  2018 9:15 AM EXAM: XR CHEST PORTABLE NUMBER OF IMAGES:  1 INDICATION: Chest pain Technique: AP view of the chest obtained portably on 2018 9:15 AM Comparison:  No prior studies available for comparison. Findings: The cardiomediastinal silhouette is within normal limits. The lungs are without gross focal consolidation. There is no pneumothorax. The visualized osseous structures demonstrate degenerative changes. Lines and Tubes: None. No gross focal consolidation. Mri Brain Wo Contrast    Result Date: 2018  Patient MRN:  [id number] : 1950 Age: 78 years Gender: Female EXAM: MRI BRAIN WO CONTRAST INDICATION:  ATAXIA, STROKE SUSPECTED AS ETIOLOGY  history of atrial fibrillation COMPARISON: CT head 2018 FINDINGS: No restricted diffusion. Mild generalized age-related involutional changes. Minor periventricular and subcortical white matter T2/FLAIR hyperintensity is nonspecific but suggestive of chronic small vessel ischemic disease. No shift of midline structure. Basal cisterns are patent. Mild ethmoid sinus and maxillary sinus mucosal thickening. Minor chronic small ischemic disease without acute stroke, midline shift or mass effect. Pertinent Cardiac TestinD echocardiogram 3/16/18  JASIEL GUARDADO                                            Phys: AURELIANO CARRILLO DO                                            : 1950 Age: 78      Sex:  F                                            Acct: [de-identified] Loc: 405 2                                               Exam Date: 2018 Status: ADM IN                                            Radiology No: [id number]                                            Unit No: [id number]                    EXAM#     TYPE/EXAM                       RESULT                     function is normal.         Wall Motion:         Left ventricular wall motion is normal.                 Nuclear Findings        SSS: 3  SRS: 0  SDS: 3        Gated LVESV: 18        TID Score: 1.09        LVEF by Gated SPECT: 83 %                 Left Ventricle Perfusion        Normal uniformed perfusion on both the stress and resting         images.        Normal left ventricular perfusion.                 Impression        Normal pharmacologic nuclear stress test.                 Conclusion        The stress and resting images show normal perfusion.        Normal study.  No scintigraphic evidence for myocardial ischemia or         scar.                 <Conclusion>        The stress and resting images show normal perfusion.                                            PAGE 2               Signed Report                     (CONTINUED)                                             Name: JASIEL GUARDADO                                             Phys: EILEEN VELEZ,ALYSE                                             : 1950 Age: 78      Sex:  Shirlean Gaucher                                        Acct: [de-identified] Loc: CARD                                                 Exam Date: 2018 Status: REG CLI                                             Radiology No: [id number]                                             Unit No: [id number]                          EXAM#     TYPE/EXAM                       RESULT                         [id number] NM/NM MYOCARDIAL PERFUSION COMP SEE REPORT                            <Continued>            Normal study.  No scintigraphic evidence for myocardial ischemia or         scar.                                         ** REPORT SIGNED IN OTHER VENDOR SYSTEM 2018 **                       Reported By: Divya Noguera MD                                                                                                                CC: Divya Noguera MD; Moshe Medley MD                     Technologist:

## 2018-07-14 NOTE — PLAN OF CARE
Problem: Pain:  Goal: Pain level will decrease  Pain level will decrease   Outcome: Not Met This Shift  Pt pain will be < 3 on 0-10 scale.

## 2018-07-16 ENCOUNTER — TELEPHONE (OUTPATIENT)
Dept: CARDIOLOGY CLINIC | Age: 68
End: 2018-07-16

## 2018-07-16 NOTE — TELEPHONE ENCOUNTER
The patient had a dual chamber pacemaker implanted by Dr Roxanne Henning for atrial fibrillation and symptomatic bradycardia. He is making plans to see her for follow up in 2 weeks with a PRUDENCE/cardioversion after that. We can see her for routine follow up in the [facility] office in about 4 - 6 weeks. Thanks!   DAB

## 2018-07-26 ENCOUNTER — NURSE ONLY (OUTPATIENT)
Dept: NON INVASIVE DIAGNOSTICS | Age: 68
End: 2018-07-26
Payer: MEDICARE

## 2018-07-26 DIAGNOSIS — R00.1 SYMPTOMATIC BRADYCARDIA: ICD-10-CM

## 2018-07-26 DIAGNOSIS — I48.19 PERSISTENT ATRIAL FIBRILLATION (HCC): ICD-10-CM

## 2018-07-26 DIAGNOSIS — Z95.0 PACEMAKER: ICD-10-CM

## 2018-07-26 DIAGNOSIS — I49.5 SICK SINUS SYNDROME (HCC): Primary | ICD-10-CM

## 2018-07-26 PROCEDURE — 93280 PM DEVICE PROGR EVAL DUAL: CPT | Performed by: INTERNAL MEDICINE

## 2018-07-30 ENCOUNTER — TELEPHONE (OUTPATIENT)
Dept: NON INVASIVE DIAGNOSTICS | Age: 68
End: 2018-07-30

## 2018-08-15 ENCOUNTER — TELEPHONE (OUTPATIENT)
Dept: NON INVASIVE DIAGNOSTICS | Age: 68
End: 2018-08-15

## 2018-08-20 ENCOUNTER — TELEPHONE (OUTPATIENT)
Dept: NON INVASIVE DIAGNOSTICS | Age: 68
End: 2018-08-20

## 2018-08-20 NOTE — TELEPHONE ENCOUNTER
Call back from Jaden Gates stating that she has been SOB this morning. She seems to be feeling ok now. Instructed that I have a message out to the NP regarding her AF.        Jony Marley

## 2018-08-20 NOTE — TELEPHONE ENCOUNTER
Dennie Haus,    Her dizziness/fatigue can be related to her anemia and possible volume related. Is she maintaining hydration, not skipping meals. If she has follow-up regarding anemia her symptoms may also be related to this. She is also on Prozac and Effexor which can contribute to dizziness. She can come into the office next week and we can discuss options. We will have to see what it said regarding anemia (if any bleeding, use of [address]) as well.      Thanks,  Kimberly Cain

## 2018-08-20 NOTE — TELEPHONE ENCOUNTER
Patient called stating that she had an episode today where the room started to spin when she was getting up and it lasted for about 30-45 seconds. She states she got up slowly and doesn't know why it happened. Instructed patient to send a latitude transmission and we will review it and call her back. She voiced understanding.   Zainab Lin RN  [facility] [address]

## 2018-08-21 NOTE — TELEPHONE ENCOUNTER
Spoke with patient regarding Greg Morejon recommendations. Patient states that she has been eating and keeping well hydrated,. Offered appointment for next week to discuss AF options. Patient will call back tomorrow. Patient does have follow up with Dr. Vinh Franks regarding her anemia.      Jony Marley

## 2018-08-22 NOTE — TELEPHONE ENCOUNTER
Patient called back and agreed to see Abi Vizcarra CNP for a appointment to discuss the onset of AF. 08/29/2018 @ 9:40 AM  is the appointment

## 2018-08-28 NOTE — PROGRESS NOTES
Pacemaker site: stable, well healed, no evidence of erosion      Pertinent Cardiac TestinD echocardiogram 3/16/18  JASIEL GUARDADO                                            Phys: AURELIANO CARRILLO DO                                            : 1950 Age: 78      Sex:  F                                            Acct: [de-identified] Loc: 405 2                                               Exam Date: 2018 Status: ADM IN                                            Radiology No: [id number]                                            Unit No: [id number]                    EXAM#     TYPE/EXAM                       RESULT                        [id number] ECHO/ECHOCARDIOGRAM COMPLETE    SEE REPORT                                    --------------- APPROVED REPORT --------------                       EXAM: Comprehensive 2D, Doppler, and color-flow        Echocardiogram               Patient Location: Inpatient               Blood Pressure: 129/51 mmHg       HR: 72 bpm       Rhythm: Atrial Fibrillation               Other Information        Study Quality: Adequate               Indications       DX: NEW ONSET AFIB               2D Dimensions           LVEF(%):  60.0 (>50%)           LVOT Diam:  2.0 (1.8-2.4cm)            Ascending Aorta:  3.5 cm           IVC:  2.0 cm       Left Atrium:  4.2 (2.7-4.0cm) TAPSE:  2.2 (<1.7)       Aortic Root:  2.5 cm  Hoff's LVEF:  60.0 %       LV Single Plane 4CH:  78.5 %                M-Mode Dimensions       RVDd:  4.1 (2.1-3.2cm)        IVSd:  1.1 (0.7-1.1cm)        LVDd:  4.1 (4.0-5.6cm) Aortic Cusp Exc.:  1.7 (1.5-2.0cm)       PWd:  1.2 (0.7-1.1cm)           FS(%):  32.7 %       LVDs:  2.8 (2.0-3.8cm) ESV (Teich):  28.5 ml       LVEF(%):  60.0 (>50%)                Volumes       Left Atrial Volume (Systole)           PAGE 1               Signed Report                     (CONTINUED)                                            Name: JASIEL GUARDADO                                 TAPSE 23 mm The right ventricular        systolic function is normal.          PAGE 2               Signed Report                     (CONTINUED)                                            Name: JASIEL GUARDADO                                            Phys: AURELIANO CARRILLO DO                                            : 1950 Age: 78      Sex: F                                            Acct: [de-identified] Loc: 405 2                                               Exam Date: 2018 Status: ADM IN                                            Radiology No: [id number]                                            Unit No: [id number]                    EXAM#     TYPE/EXAM                       RESULT                        [id number] ECHO/ECHOCARDIOGRAM COMPLETE    SEE REPORT                           <Continued>                  Atria       Left atrium is mildly dilated. Right atrium is mildly        dilated.               Aortic Valve       The Aortic valve is sclerotic. Aortic valve is trileaflet. No aortic        regurgitation is present. There is no aortic valvular stenosis.              Mitral Valve       The mitral valve is normal in structure. Mild to moderate mitral        regurgitation. There is no mitral valve stenosis.              Tricuspid Valve       The tricuspid valve is normal in structure. Mild tricuspid        regurgitation. The RVSP is _____35__ mmHg.               Pulmonic Valve       The pulmonary valve is normal in structure. There is no pulmonic        valvular stenosis. Trace to mild pulmonic regurgitation.               Great Vessels       The aortic root is normal in size.  The IVC is normal in size and        collapses >50% with inspiration.               Pericardium       There is no pericardial effusion.               Critical Notification       Critical Value: No               <Conclusion>       The left ventricle is normal size.       There is normal left ventricular wall thickness.                                EXAM#     TYPE/EXAM                       RESULT                         [id number] NM/NM MYOCARDIAL PERFUSION COMP SEE REPORT                                       --------------- APPROVED REPORT --------------                          Exam: Pharmacologic        Reason for Exam: Atrial Fibrillation        Nurse: Gilbert Goode RN        Patient Location: Outpatient                                   NM Exam: Myocardial Perfusion REST/STRESS        Imaging Protocol: Rest Tc-99m/Stress Tc-99m 1 day                 Consent: The procedure was explained and understood by the patient.         Informed consent was witnessed by RN                 Resting Data        Rest SPECT myocardial perfusion imaging was performed in supine         position 45 minutes following the intravenous injection of 10.3 mCi         of Tc-99m Sestamibi.        Time of rest injection: 1100     Date: 2018        Time of rest imagin     Date: 2018                 Pharmacologic Stress        Pharmacologic stress test was performed by injecting Regadenoson 0.4         mg IV push followed by the intravenous injection of 37.3 mCi of         Tc-99m Sestamibi.        Time of stress injection: 1238     Date: 2018        Time of stress imagin     Date: 2018        Heart Rate at time of stress injection: 77 bpm.        Gated Stress SPECT was performed 45 minutes after stress         injection.        The images were gated to evaluate regional wall motion and calculate         left ventricular ejection fraction.                  Study Quality        Study quality was good.        Artifact: Breast artifact            PAGE 1               Signed Report                     (CONTINUED)                                             Name: JASIEL GUARDADO                                             Phys: EILEEN VELEZ,ALYSE                                             : 1950 Age: 78      Sex: F        Unit No: [id number]                          EXAM#     TYPE/EXAM                       RESULT                         [id number] NM/NM MYOCARDIAL PERFUSION COMP SEE REPORT                            <Continued>            Normal study.  No scintigraphic evidence for myocardial ischemia or         scar.                                         ** REPORT SIGNED IN OTHER VENDOR SYSTEM 03/23/2018 **                       Reported By: Darien Samuels MD                                                                                                                CC: Darien Samuels MD; Danay Stark MD                     Technologist: Pema Kruger   Transcribed Date/Time: 03/23/2018 (1752)        : JOANNA        Printed Date/Time: 03/23/2018 (9284     Device Interrogation/Reprogramming  Make/Model BSCI. Accolade. DOI 7/13/18  Mode DDDR 60/130 ppm  F wave: 0.3 mV  Impedance: 592 ohms   Threshold: unattainable d/t AF V  RV R wave: 6.3 mV  Impedance: 710 ohms   Threshold: 0.9 V @ 0.4 ms  Pacing: A: 5%  RV: 81%   Battery Voltage/Longevity:  11 years    Arrhythmias: AF    Reprogramming included: RA sensitivity from 0.25 to 0. 15. RV output form 3.5V @ 0.4 ms to auto @ 0.4 ms. MV sensor factor from 8 to 10    Overall device function is normal  All device programmable settings were evaluated per above and in the scanned document, along with iterative adjustments (capture thresholds) to assess and select the most appropriate final programming to provide for consistent delivery of the appropriate therapy and to verify function of the device. I have independently reviewed rhythm strips per above    I have personally reviewed the laboratory, cardiac diagnostic and radiographic testing as outlined above:      Impression:    1.  Persistent atrial fibrillation  - SVR  - diagnosed 3/21/18  - [id number] score: 2 (age, sex)  - Xarelto- admits to missing doses during hospitalization 7/12/18  - Holter monitor 7/5/18: time involved face-to-face time providing counseling and or coordination of care with the other providers    Gaetano Ivey, MSN, APRN-CNP, AGACNP, [address] [facility]      CC: Dr Alejandro Lester

## 2018-08-29 ENCOUNTER — OFFICE VISIT (OUTPATIENT)
Dept: NON INVASIVE DIAGNOSTICS | Age: 68
End: 2018-08-29
Payer: MEDICARE

## 2018-08-29 VITALS
SYSTOLIC BLOOD PRESSURE: 120 MMHG | RESPIRATION RATE: 16 BRPM | HEIGHT: 67 IN | WEIGHT: 195 LBS | HEART RATE: 70 BPM | DIASTOLIC BLOOD PRESSURE: 70 MMHG | BODY MASS INDEX: 30.61 KG/M2

## 2018-08-29 DIAGNOSIS — I48.19 PERSISTENT ATRIAL FIBRILLATION (HCC): ICD-10-CM

## 2018-08-29 DIAGNOSIS — Z95.0 PACEMAKER: ICD-10-CM

## 2018-08-29 PROCEDURE — G8427 DOCREV CUR MEDS BY ELIG CLIN: HCPCS | Performed by: NURSE PRACTITIONER

## 2018-08-29 PROCEDURE — 4040F PNEUMOC VAC/ADMIN/RCVD: CPT | Performed by: NURSE PRACTITIONER

## 2018-08-29 PROCEDURE — 1036F TOBACCO NON-USER: CPT | Performed by: NURSE PRACTITIONER

## 2018-08-29 PROCEDURE — 3017F COLORECTAL CA SCREEN DOC REV: CPT | Performed by: NURSE PRACTITIONER

## 2018-08-29 PROCEDURE — 1123F ACP DISCUSS/DSCN MKR DOCD: CPT | Performed by: NURSE PRACTITIONER

## 2018-08-29 PROCEDURE — G8417 CALC BMI ABV UP PARAM F/U: HCPCS | Performed by: NURSE PRACTITIONER

## 2018-08-29 PROCEDURE — 93280 PM DEVICE PROGR EVAL DUAL: CPT | Performed by: NURSE PRACTITIONER

## 2018-08-29 PROCEDURE — 1090F PRES/ABSN URINE INCON ASSESS: CPT | Performed by: NURSE PRACTITIONER

## 2018-08-29 PROCEDURE — 99024 POSTOP FOLLOW-UP VISIT: CPT | Performed by: NURSE PRACTITIONER

## 2018-08-29 PROCEDURE — 1101F PT FALLS ASSESS-DOCD LE1/YR: CPT | Performed by: NURSE PRACTITIONER

## 2018-08-29 PROCEDURE — G8400 PT W/DXA NO RESULTS DOC: HCPCS | Performed by: NURSE PRACTITIONER

## 2018-09-05 ENCOUNTER — ANESTHESIA (OUTPATIENT)
Dept: CARDIAC CATH/INVASIVE PROCEDURES | Age: 68
End: 2018-09-05

## 2018-09-05 ENCOUNTER — HOSPITAL ENCOUNTER (OUTPATIENT)
Dept: CARDIAC CATH/INVASIVE PROCEDURES | Age: 68
Discharge: HOME | End: 2018-09-05
Payer: MEDICARE

## 2018-09-05 ENCOUNTER — ANESTHESIA EVENT (OUTPATIENT)
Dept: CARDIAC CATH/INVASIVE PROCEDURES | Age: 68
End: 2018-09-05

## 2018-09-05 VITALS
DIASTOLIC BLOOD PRESSURE: 63 MMHG | OXYGEN SATURATION: 96 % | SYSTOLIC BLOOD PRESSURE: 92 MMHG | RESPIRATION RATE: 17 BRPM

## 2018-09-05 VITALS
SYSTOLIC BLOOD PRESSURE: 122 MMHG | RESPIRATION RATE: 20 BRPM | OXYGEN SATURATION: 96 % | DIASTOLIC BLOOD PRESSURE: 63 MMHG | HEART RATE: 74 BPM | TEMPERATURE: 96.7 F

## 2018-09-05 DIAGNOSIS — I34.0 SEVERE MITRAL REGURGITATION: Primary | ICD-10-CM

## 2018-09-05 LAB
ANION GAP SERPL CALCULATED.3IONS-SCNC: 12 MMOL/L (ref 7–16)
BASOPHILS ABSOLUTE: 0.06 E9/L (ref 0–0.2)
BASOPHILS RELATIVE PERCENT: 0.4 % (ref 0–2)
BUN BLDV-MCNC: 18 MG/DL (ref 8–23)
CALCIUM SERPL-MCNC: 8.4 MG/DL (ref 8.6–10.2)
CHLORIDE BLD-SCNC: 106 MMOL/L (ref 98–107)
CO2: 25 MMOL/L (ref 22–29)
CREAT SERPL-MCNC: 0.8 MG/DL (ref 0.5–1)
EKG ATRIAL RATE: 71 BPM
EKG ATRIAL RATE: 76 BPM
EKG P-R INTERVAL: 266 MS
EKG Q-T INTERVAL: 470 MS
EKG Q-T INTERVAL: 498 MS
EKG QRS DURATION: 162 MS
EKG QRS DURATION: 168 MS
EKG QTC CALCULATION (BAZETT): 507 MS
EKG QTC CALCULATION (BAZETT): 541 MS
EKG R AXIS: -63 DEGREES
EKG R AXIS: -68 DEGREES
EKG T AXIS: 77 DEGREES
EKG T AXIS: 92 DEGREES
EKG VENTRICULAR RATE: 70 BPM
EKG VENTRICULAR RATE: 71 BPM
EOSINOPHILS ABSOLUTE: 0.19 E9/L (ref 0.05–0.5)
EOSINOPHILS RELATIVE PERCENT: 1.3 % (ref 0–6)
GFR AFRICAN AMERICAN: >60
GFR NON-AFRICAN AMERICAN: >60 ML/MIN/1.73
GLUCOSE BLD-MCNC: 93 MG/DL (ref 74–109)
HCT VFR BLD CALC: 31.2 % (ref 34–48)
HEMOGLOBIN: 9.6 G/DL (ref 11.5–15.5)
IMMATURE GRANULOCYTES #: 0.09 E9/L
IMMATURE GRANULOCYTES %: 0.6 % (ref 0–5)
LYMPHOCYTES ABSOLUTE: 3.04 E9/L (ref 1.5–4)
LYMPHOCYTES RELATIVE PERCENT: 21.4 % (ref 20–42)
MCH RBC QN AUTO: 30.9 PG (ref 26–35)
MCHC RBC AUTO-ENTMCNC: 30.8 % (ref 32–34.5)
MCV RBC AUTO: 100.3 FL (ref 80–99.9)
MONOCYTES ABSOLUTE: 1.08 E9/L (ref 0.1–0.95)
MONOCYTES RELATIVE PERCENT: 7.6 % (ref 2–12)
NEUTROPHILS ABSOLUTE: 9.73 E9/L (ref 1.8–7.3)
NEUTROPHILS RELATIVE PERCENT: 68.7 % (ref 43–80)
PDW BLD-RTO: 14.6 FL (ref 11.5–15)
PLATELET # BLD: 278 E9/L (ref 130–450)
PMV BLD AUTO: 10 FL (ref 7–12)
POTASSIUM SERPL-SCNC: 4.3 MMOL/L (ref 3.5–5)
RBC # BLD: 3.11 E12/L (ref 3.5–5.5)
SODIUM BLD-SCNC: 143 MMOL/L (ref 132–146)
WBC # BLD: 14.2 E9/L (ref 4.5–11.5)

## 2018-09-05 PROCEDURE — 36415 COLL VENOUS BLD VENIPUNCTURE: CPT

## 2018-09-05 PROCEDURE — 92960 CARDIOVERSION ELECTRIC EXT: CPT | Performed by: INTERNAL MEDICINE

## 2018-09-05 PROCEDURE — 93010 ELECTROCARDIOGRAM REPORT: CPT | Performed by: INTERNAL MEDICINE

## 2018-09-05 PROCEDURE — 2709999900 HC NON-CHARGEABLE SUPPLY

## 2018-09-05 PROCEDURE — 93312 ECHO TRANSESOPHAGEAL: CPT

## 2018-09-05 PROCEDURE — 80048 BASIC METABOLIC PNL TOTAL CA: CPT

## 2018-09-05 PROCEDURE — 6360000002 HC RX W HCPCS: Performed by: NURSE ANESTHETIST, CERTIFIED REGISTERED

## 2018-09-05 PROCEDURE — 93325 DOPPLER ECHO COLOR FLOW MAPG: CPT

## 2018-09-05 PROCEDURE — 2580000003 HC RX 258: Performed by: INTERNAL MEDICINE

## 2018-09-05 PROCEDURE — 85025 COMPLETE CBC W/AUTO DIFF WBC: CPT

## 2018-09-05 PROCEDURE — 2580000003 HC RX 258: Performed by: NURSE ANESTHETIST, CERTIFIED REGISTERED

## 2018-09-05 PROCEDURE — 93005 ELECTROCARDIOGRAM TRACING: CPT | Performed by: INTERNAL MEDICINE

## 2018-09-05 PROCEDURE — 93321 DOPPLER ECHO F-UP/LMTD STD: CPT

## 2018-09-05 PROCEDURE — 3700000001 HC ADD 15 MINUTES (ANESTHESIA)

## 2018-09-05 PROCEDURE — 93280 PM DEVICE PROGR EVAL DUAL: CPT | Performed by: INTERNAL MEDICINE

## 2018-09-05 PROCEDURE — 3700000000 HC ANESTHESIA ATTENDED CARE

## 2018-09-05 RX ORDER — SODIUM CHLORIDE 9 MG/ML
INJECTION, SOLUTION INTRAVENOUS ONCE
Status: COMPLETED | OUTPATIENT
Start: 2018-09-05 | End: 2018-09-05

## 2018-09-05 RX ORDER — PROPOFOL 10 MG/ML
INJECTION, EMULSION INTRAVENOUS PRN
Status: DISCONTINUED | OUTPATIENT
Start: 2018-09-05 | End: 2018-09-05 | Stop reason: SDUPTHER

## 2018-09-05 RX ORDER — SODIUM CHLORIDE 9 MG/ML
INJECTION, SOLUTION INTRAVENOUS CONTINUOUS PRN
Status: DISCONTINUED | OUTPATIENT
Start: 2018-09-05 | End: 2018-09-05 | Stop reason: SDUPTHER

## 2018-09-05 RX ADMIN — PROPOFOL 200 MG: 10 INJECTION, EMULSION INTRAVENOUS at 10:26

## 2018-09-05 RX ADMIN — SODIUM CHLORIDE: 9 INJECTION, SOLUTION INTRAVENOUS at 09:24

## 2018-09-05 RX ADMIN — SODIUM CHLORIDE: 9 INJECTION, SOLUTION INTRAVENOUS at 10:17

## 2018-09-05 ASSESSMENT — ENCOUNTER SYMPTOMS: SHORTNESS OF BREATH: 1

## 2018-09-05 NOTE — ANESTHESIA PRE PROCEDURE
Department of Anesthesiology  Preprocedure Note       Name:  [place] Juan   Age:  78 y.o.  :  1950                                          MRN:  [id number]         Date:  2018      Surgeon: * Surgery not found *    Procedure:     Medications prior to admission:   Prior to Admission medications    Medication Sig Start Date End Date Taking? Authorizing Provider   LORazepam (ATIVAN) 1 MG tablet Take 1 mg by mouth every morning. .   Yes Historical Provider, MD   atorvastatin (LIPITOR) 10 MG tablet Take 10 mg by mouth every morning  18  Yes Historical Provider, MD   cyanocobalamin 1000 MCG/ML injection Inject 1,000 mcg into the muscle every 30 days  18  Yes Historical Provider, MD   FLUoxetine (PROZAC) 40 MG capsule Take 40 mg by mouth every morning  18  Yes Historical Provider, MD   venlafaxine (EFFEXOR XR) 75 MG extended release capsule Take 75 mg by mouth every morning  18  Yes Historical Provider, MD   omeprazole (PRILOSEC) 40 MG delayed release capsule Take 40 mg by mouth daily as needed  18  Yes Historical Provider, MD   rivaroxaban (XARELTO) 20 MG TABS tablet Take 20 mg by mouth Daily with supper    Yes Historical Provider, MD       Current medications:    Current Outpatient Prescriptions   Medication Sig Dispense Refill    LORazepam (ATIVAN) 1 MG tablet Take 1 mg by mouth every morning. Nir Chun atorvastatin (LIPITOR) 10 MG tablet Take 10 mg by mouth every morning       cyanocobalamin 1000 MCG/ML injection Inject 1,000 mcg into the muscle every 30 days       FLUoxetine (PROZAC) 40 MG capsule Take 40 mg by mouth every morning       venlafaxine (EFFEXOR XR) 75 MG extended release capsule Take 75 mg by mouth every morning       omeprazole (PRILOSEC) 40 MG delayed release capsule Take 40 mg by mouth daily as needed       rivaroxaban (XARELTO) 20 MG TABS tablet Take 20 mg by mouth Daily with supper        No current facility-administered medications for this encounter.

## 2018-09-13 ENCOUNTER — HOSPITAL ENCOUNTER (OUTPATIENT)
Age: 68
Discharge: HOME | End: 2018-09-13
Payer: MEDICARE

## 2018-09-13 ENCOUNTER — TELEPHONE (OUTPATIENT)
Dept: NON INVASIVE DIAGNOSTICS | Age: 68
End: 2018-09-13

## 2018-09-13 ENCOUNTER — HOSPITAL ENCOUNTER (OUTPATIENT)
Dept: NON INVASIVE DIAGNOSTICS | Age: 68
Discharge: HOME | End: 2018-09-13
Payer: MEDICARE

## 2018-09-13 VITALS
WEIGHT: 194 LBS | HEIGHT: 67 IN | DIASTOLIC BLOOD PRESSURE: 71 MMHG | HEART RATE: 70 BPM | BODY MASS INDEX: 30.45 KG/M2 | SYSTOLIC BLOOD PRESSURE: 128 MMHG | TEMPERATURE: 97.9 F

## 2018-09-13 DIAGNOSIS — I34.0 NON-RHEUMATIC MITRAL REGURGITATION: Primary | ICD-10-CM

## 2018-09-13 DIAGNOSIS — I34.0 NON-RHEUMATIC MITRAL REGURGITATION: ICD-10-CM

## 2018-09-13 LAB
ALBUMIN SERPL-MCNC: 3.7 G/DL (ref 3.5–5.2)
ALP BLD-CCNC: 146 U/L (ref 35–104)
ALT SERPL-CCNC: 29 U/L (ref 0–32)
ANION GAP SERPL CALCULATED.3IONS-SCNC: 16 MMOL/L (ref 7–16)
AST SERPL-CCNC: 44 U/L (ref 0–31)
BILIRUB SERPL-MCNC: 0.4 MG/DL (ref 0–1.2)
BUN BLDV-MCNC: 13 MG/DL (ref 8–23)
CALCIUM SERPL-MCNC: 8.5 MG/DL (ref 8.6–10.2)
CHLORIDE BLD-SCNC: 105 MMOL/L (ref 98–107)
CO2: 21 MMOL/L (ref 22–29)
CREAT SERPL-MCNC: 0.9 MG/DL (ref 0.5–1)
GFR AFRICAN AMERICAN: >60
GFR NON-AFRICAN AMERICAN: >60 ML/MIN/1.73
GLUCOSE BLD-MCNC: 95 MG/DL (ref 74–109)
HCT VFR BLD CALC: 31.6 % (ref 34–48)
HEMOGLOBIN: 9.7 G/DL (ref 11.5–15.5)
INR BLD: 1.4
MCH RBC QN AUTO: 31.3 PG (ref 26–35)
MCHC RBC AUTO-ENTMCNC: 30.7 % (ref 32–34.5)
MCV RBC AUTO: 101.9 FL (ref 80–99.9)
PDW BLD-RTO: 14.5 FL (ref 11.5–15)
PLATELET # BLD: 332 E9/L (ref 130–450)
PMV BLD AUTO: 10 FL (ref 7–12)
POTASSIUM SERPL-SCNC: 4.2 MMOL/L (ref 3.5–5)
PROTHROMBIN TIME: 15.9 SEC (ref 9.3–12.4)
RBC # BLD: 3.1 E12/L (ref 3.5–5.5)
SODIUM BLD-SCNC: 142 MMOL/L (ref 132–146)
TOTAL PROTEIN: 6.7 G/DL (ref 6.4–8.3)
WBC # BLD: 13.3 E9/L (ref 4.5–11.5)

## 2018-09-13 PROCEDURE — 36415 COLL VENOUS BLD VENIPUNCTURE: CPT

## 2018-09-13 PROCEDURE — 85610 PROTHROMBIN TIME: CPT

## 2018-09-13 PROCEDURE — 99204 OFFICE O/P NEW MOD 45 MIN: CPT | Performed by: THORACIC SURGERY (CARDIOTHORACIC VASCULAR SURGERY)

## 2018-09-13 PROCEDURE — 85027 COMPLETE CBC AUTOMATED: CPT

## 2018-09-13 PROCEDURE — 80053 COMPREHEN METABOLIC PANEL: CPT

## 2018-09-13 PROCEDURE — 99211 OFF/OP EST MAY X REQ PHY/QHP: CPT

## 2018-09-13 NOTE — TELEPHONE ENCOUNTER
Marcela,    Please let her know that she is back in AF but she just had an appt [facility]. I do not see a note from then yet re: recommendations with regards to her severe MR which may be contributing to the AF.   If there is no plan for surgery, then we will need to discuss AAD therapy etc.    ALK

## 2018-09-13 NOTE — TELEPHONE ENCOUNTER
Latitude alert for AF. Reviewed latitude:   Real time AF/   @ 70  AF since 9.9.2018  V- rates:  Majority 70 up to 130     CV on 9. 5.2018. On Xarelto. Forwarding to Dr. Dank Marley

## 2018-09-13 NOTE — PROGRESS NOTES
[facility]  Visit Note      Patient name: Frank Hightower    Reason for consult: MR/TR/Afib    Referring Physician: Dr. Sathya Gurrola    Primary Care Physician: Jese Gutierrez MD, MD    Date of service: 9/13/2018    Chief Complaint: GU    HPI: 78year old S/P PPM who also has Afib was found to have severe MR and mod TR when she was cardioverted. She reports GU with walking, even on flat surfaces. She has a history of myelodysplastic disorder but her blood counts are pretty good and she is not on immunosuppression. She denies CP or LE edema. She reports the UG gets better with rest.    Allergies: Allergies   Allergen Reactions    Iv Dye [Iodides]     Pcn [Penicillins]        Home medications:    Current Outpatient Prescriptions   Medication Sig Dispense Refill    LORazepam (ATIVAN) 1 MG tablet Take 1 mg by mouth every morning. Maylon Parkerfield atorvastatin (LIPITOR) 10 MG tablet Take 10 mg by mouth every morning       cyanocobalamin 1000 MCG/ML injection Inject 1,000 mcg into the muscle every 30 days       FLUoxetine (PROZAC) 40 MG capsule Take 40 mg by mouth every morning       venlafaxine (EFFEXOR XR) 75 MG extended release capsule Take 75 mg by mouth every morning       omeprazole (PRILOSEC) 40 MG delayed release capsule Take 40 mg by mouth daily as needed       rivaroxaban (XARELTO) 20 MG TABS tablet Take 20 mg by mouth Daily with supper        No current facility-administered medications for this encounter.         Past Medical History:  Past Medical History:   Diagnosis Date    Anemia     Anxiety     Atrial fibrillation (HCC)     Bradycardia     Chest pain     Depression     Dyspnea     GERD (gastroesophageal reflux disease)     Lung nodule     Transaminitis        Past Surgical History:  Past Surgical History:   Procedure Laterality Date    ABDOMEN SURGERY      CARDIOVERSION  09/05/2018    DEBBIE Robin, DIAGNOSTIC      PACEMAKER INSERTION  07/13/ 2018    Dual Chamber ([facility]) -     TRANSESOPHAGEAL ECHOCARDIOGRAM  09/05/2018    Dr. Talat Latham       Social History:  Social History     Social History    Marital status:      Spouse name: N/A    Number of children: N/A    Years of education: N/A     Occupational History    Not on file. Social History Main Topics    Smoking status: Former Smoker     Packs/day: 0.25     Years: 3.00     Quit date: 7/12/1983    Smokeless tobacco: Never Used    Alcohol use No    Drug use: No    Sexual activity: Not on file     Other Topics Concern    Not on file     Social History Narrative    No narrative on file       Family History:  No family history on file. Review of Systems:  Constitutional: Denies fevers, chills, or weight loss. HEENT: Denies visual changes or hearing loss. Heart: As per HPI. Lungs: Denies shortness of breath, cough, or wheezing. Gastrointestinal: Denies nausea, vomiting, constipation, or diarrhea. Genitourinary: dysuria or hematuria. Psychiatric: Patient denies anxiety or depression. Neurologic: Patient denies weakness of the extremities, dizziness, or headaches. All other ROS checked and found to be negative. Objective:  Vitals /71 (Site: Left Upper Arm, Position: Sitting)   Pulse 70   Temp 97.9 °F (36.6 °C) (Oral)   Ht 5' 7\" (1.702 m)   Wt 194 lb (88 kg)   BMI 30.38 kg/m²   General Appearance: Pleasant 78y.o. year old female who appears stated age. Communicates well, no acute distress. HEENT: Head is normocephalic, atraumatic. EOMs intact, PERRL. Trachea midline. Lungs: Normal respiratory rate and normal effort. She is not in respiratory distress. Breath sounds clear to auscultation. No wheezes. Heart: Normal rate. Regular rhythm. S1 normal and S2 normal. Positive for murmur. Chest: Symmetric chest wall expansion. Extremities: Normal range of motion.      Neurological: Patient is alert and

## 2018-09-18 NOTE — TELEPHONE ENCOUNTER
I spoke with Genesis Samson and she is scheduled for a heart cath next week. She was also asked to get an appt with the dentist, which is scheduled for tomorrow. Once all of this is done they intend on scheduling her for surgery ASAP. I let her know I would make you aware, she verbalized understanding.

## 2018-09-20 RX ORDER — PREDNISONE 50 MG/1
50 TABLET ORAL DAILY
Qty: 3 TABLET | Refills: 0 | Status: SHIPPED | OUTPATIENT
Start: 2018-09-20 | End: 2018-09-23

## 2018-09-20 RX ORDER — DIPHENHYDRAMINE HCL 50 MG
50 CAPSULE ORAL ONCE
Qty: 1 CAPSULE | Refills: 0 | Status: SHIPPED | OUTPATIENT
Start: 2018-09-20 | End: 2018-09-20

## 2018-09-28 ENCOUNTER — HOSPITAL ENCOUNTER (OUTPATIENT)
Dept: CARDIAC CATH/INVASIVE PROCEDURES | Age: 68
Discharge: HOME | End: 2018-09-28
Payer: MEDICARE

## 2018-09-28 LAB
ABO/RH: NORMAL
ANTIBODY SCREEN: NORMAL

## 2018-09-28 PROCEDURE — 86901 BLOOD TYPING SEROLOGIC RH(D): CPT

## 2018-09-28 PROCEDURE — 86900 BLOOD TYPING SEROLOGIC ABO: CPT

## 2018-09-28 PROCEDURE — C1887 CATHETER, GUIDING: HCPCS

## 2018-09-28 PROCEDURE — 2580000003 HC RX 258: Performed by: INTERNAL MEDICINE

## 2018-09-28 PROCEDURE — C1769 GUIDE WIRE: HCPCS

## 2018-09-28 PROCEDURE — C1894 INTRO/SHEATH, NON-LASER: HCPCS

## 2018-09-28 PROCEDURE — 93454 CORONARY ARTERY ANGIO S&I: CPT | Performed by: INTERNAL MEDICINE

## 2018-09-28 PROCEDURE — 86850 RBC ANTIBODY SCREEN: CPT

## 2018-09-28 PROCEDURE — 6360000002 HC RX W HCPCS

## 2018-09-28 PROCEDURE — 2500000003 HC RX 250 WO HCPCS

## 2018-09-28 PROCEDURE — 36415 COLL VENOUS BLD VENIPUNCTURE: CPT

## 2018-09-28 PROCEDURE — 2709999900 HC NON-CHARGEABLE SUPPLY

## 2018-09-28 RX ORDER — SODIUM CHLORIDE 9 MG/ML
INJECTION, SOLUTION INTRAVENOUS ONCE
Status: COMPLETED | OUTPATIENT
Start: 2018-09-28 | End: 2018-09-28

## 2018-09-28 RX ORDER — DIPHENHYDRAMINE HCL 50 MG
50 CAPSULE ORAL EVERY 6 HOURS PRN
COMMUNITY
End: 2018-10-04 | Stop reason: ALTCHOICE

## 2018-09-28 RX ORDER — PREDNISONE 50 MG/1
50 TABLET ORAL DAILY
COMMUNITY
End: 2018-10-04 | Stop reason: ALTCHOICE

## 2018-09-28 RX ADMIN — SODIUM CHLORIDE: 9 INJECTION, SOLUTION INTRAVENOUS at 06:52

## 2018-09-28 NOTE — H&P
Presented for elective cath in setting of pre valve surgery. Risks/benefits reviewed with the patient in detail. She is willing to proceed. See Dr Rehana Marlow office note for details. Cath will be attempted using radial approach.

## 2018-09-28 NOTE — OP NOTE
CARDIAC CATHETERIZATION    : Opal Smith        Date of procedure: 9/28/2018      Indication:  1. MR, TR  2. AUC indication: 70  3. AUC score: 7    Procedure:Coronary angiography    Anesthesia: local and moderate conscious sedation  Time sedation was administered: 07.47. I was present in the room when sedation was administered. Procedure end time: 08.04  Time spent with face to face monitoring of moderate sedation: 17min    LHC performed via right radial approach using a Slender 5F sheath. 200mcg of nitroglycerine administered through the sheath. 5000U heparin administered IV after access. Informed consent was obtained. The patient was transferred to the catheterization laboratory in a stable condition. The right groin and right wrist were sterilely prepped and draped. Two mg of Versed and 50mcg of fentanyl was administered for conscious sedation. Then, 2% Xylocaine was infiltrated in the right wrist. Using Seldinger technique, the right radial arteriotomy was performed and a Slender 5F sheath was placed in the right radial artery. Sheath was adequately aspirated and flushed. Using diluted contrast,  angiogram of the radial and brachial artery was obtained. Then,  200 mcg of diluted nitroglycerin was administered through a sheath. Angiogram of the left and right coronary system were obtained using a 5-[place] JL 3.5 and Bird catheter. At the end of the procedure, a TR band was successfully deployed with good hemostasis. There were no complications. Patient tolerated the procedure well and was transferred to floor for monitoring. Findings:  Left main: 0% stenosis  LAD: mid 20 % stenosis  Circumflex: large OM: 0 % stenosis  RCA: Dominant. Mid 20 % stenosis  LV angio: not performed. Hemodynamics: Ao: 98/53(71). Sheath removed and TR band applied. There was good hemostasis achieved and the distal pulses were intact.            Complication: None   Estimated blood loss: 10ml. Contrast use: 20cc    Post op diagnosis:  1. Mild epicardial CAD    PLAN:  1. CTS consult.

## 2018-10-03 ENCOUNTER — TELEPHONE (OUTPATIENT)
Dept: CARDIOLOGY CLINIC | Age: 68
End: 2018-10-03

## 2018-10-03 DIAGNOSIS — R55 NEAR SYNCOPE: ICD-10-CM

## 2018-10-03 DIAGNOSIS — R06.09 DYSPNEA ON EXERTION: ICD-10-CM

## 2018-10-03 DIAGNOSIS — I49.5 SICK SINUS SYNDROME (HCC): Primary | ICD-10-CM

## 2018-10-03 NOTE — TELEPHONE ENCOUNTER
Please have her get a BMP and CBC (orders in [facility]) and see if we can get her into the [place] [address] or [facility] in the near future. Thanks!   DAB

## 2018-10-04 ENCOUNTER — OFFICE VISIT (OUTPATIENT)
Dept: CARDIOLOGY CLINIC | Age: 68
End: 2018-10-04
Payer: MEDICARE

## 2018-10-04 VITALS
DIASTOLIC BLOOD PRESSURE: 70 MMHG | BODY MASS INDEX: 30.45 KG/M2 | WEIGHT: 194 LBS | HEIGHT: 67 IN | HEART RATE: 77 BPM | SYSTOLIC BLOOD PRESSURE: 124 MMHG

## 2018-10-04 DIAGNOSIS — I49.5 SICK SINUS SYNDROME (HCC): ICD-10-CM

## 2018-10-04 DIAGNOSIS — I34.0 NON-RHEUMATIC MITRAL REGURGITATION: ICD-10-CM

## 2018-10-04 DIAGNOSIS — I48.19 PERSISTENT ATRIAL FIBRILLATION (HCC): Primary | ICD-10-CM

## 2018-10-04 DIAGNOSIS — I50.33 ACUTE ON CHRONIC DIASTOLIC CHF (CONGESTIVE HEART FAILURE) (HCC): ICD-10-CM

## 2018-10-04 DIAGNOSIS — D46.9 MYELODYSPLASTIC SYNDROME (HCC): Chronic | ICD-10-CM

## 2018-10-04 LAB
ABSOLUTE BASO #: 0.1 10*3/UL (ref 0–0.1)
ABSOLUTE EOS #: 0.1 10*3/UL (ref 0–0.4)
ABSOLUTE NEUT #: 14.7 10*3/UL (ref 2.3–7.9)
BASOPHILS %: 0.5 % (ref 0–1)
BUN BLDV-MCNC: 15 MG/DL (ref 7–24)
CALCIUM SERPL-MCNC: 8.1 MG/DL (ref 8.5–10.5)
CHLORIDE BLD-SCNC: 106 MMOL/L (ref 98–107)
CO2: 26 MMOL/L (ref 21–32)
CREAT SERPL-MCNC: 0.95 MG/DL (ref 0.55–1.02)
EOSINOPHILS %: 0.7 % (ref 1–4)
GFR AFRICAN AMERICAN: > 60 ML/MIN
GFR SERPL CREATININE-BSD FRML MDRD: 59 ML/MIN/
GLUCOSE: 85 MG/DL (ref 65–99)
HCT VFR BLD CALC: 32.8 % (ref 37–47)
HEMOGLOBIN: 10.2 G/DL (ref 12–16)
IMMATURE GRANULOCYTES #: 0.1 10*3/UL (ref 0–0.1)
IMMATURE GRANULOCYTES: 0.6 % (ref 0–1)
LYMPHOCYTE %: 17.8 % (ref 27–41)
LYMPHOCYTES # BLD: 3.5 10*3/UL (ref 1.3–4.4)
MCH RBC QN AUTO: 31.7 PG (ref 27–31)
MCHC RBC AUTO-ENTMCNC: 31.1 G/DL (ref 33–37)
MCV RBC AUTO: 101.9 FL (ref 81–99)
MONOCYTES # BLD: 1.4 10*3/UL (ref 0.1–1)
MONOCYTES %: 6.8 % (ref 3–9)
NEUTROPHILS %: 73.6 % (ref 47–73)
NUCLEATED RED BLOOD CELLS: 0 % (ref 0–0)
PDW BLD-RTO: 13.5 % (ref 0–14.5)
PLATELET # BLD: 282 10*3/UL (ref 130–400)
PMV BLD AUTO: 10 FL (ref 9.6–12.3)
POTASSIUM SERPL-SCNC: 4 MMOL/L (ref 3.5–5.1)
RBC # BLD: 3.22 10*6/UL (ref 4.1–5.1)
SODIUM BLD-SCNC: 140 MMOL/L (ref 136–145)
WBC # BLD: 19.9 10*3/UL (ref 4.8–10.8)

## 2018-10-04 PROCEDURE — 1123F ACP DISCUSS/DSCN MKR DOCD: CPT | Performed by: INTERNAL MEDICINE

## 2018-10-04 PROCEDURE — 1036F TOBACCO NON-USER: CPT | Performed by: INTERNAL MEDICINE

## 2018-10-04 PROCEDURE — G8417 CALC BMI ABV UP PARAM F/U: HCPCS | Performed by: INTERNAL MEDICINE

## 2018-10-04 PROCEDURE — 1090F PRES/ABSN URINE INCON ASSESS: CPT | Performed by: INTERNAL MEDICINE

## 2018-10-04 PROCEDURE — 99214 OFFICE O/P EST MOD 30 MIN: CPT | Performed by: INTERNAL MEDICINE

## 2018-10-04 PROCEDURE — G8427 DOCREV CUR MEDS BY ELIG CLIN: HCPCS | Performed by: INTERNAL MEDICINE

## 2018-10-04 PROCEDURE — 4040F PNEUMOC VAC/ADMIN/RCVD: CPT | Performed by: INTERNAL MEDICINE

## 2018-10-04 PROCEDURE — G8400 PT W/DXA NO RESULTS DOC: HCPCS | Performed by: INTERNAL MEDICINE

## 2018-10-04 PROCEDURE — 93000 ELECTROCARDIOGRAM COMPLETE: CPT | Performed by: INTERNAL MEDICINE

## 2018-10-04 PROCEDURE — 1101F PT FALLS ASSESS-DOCD LE1/YR: CPT | Performed by: INTERNAL MEDICINE

## 2018-10-04 PROCEDURE — 3017F COLORECTAL CA SCREEN DOC REV: CPT | Performed by: INTERNAL MEDICINE

## 2018-10-04 PROCEDURE — G8484 FLU IMMUNIZE NO ADMIN: HCPCS | Performed by: INTERNAL MEDICINE

## 2018-10-04 RX ORDER — FUROSEMIDE 20 MG/1
20 TABLET ORAL
Qty: 60 TABLET | Refills: 3 | Status: ON HOLD | OUTPATIENT
Start: 2018-10-04 | End: 2018-11-12 | Stop reason: HOSPADM

## 2018-10-04 RX ORDER — LOSARTAN POTASSIUM 25 MG/1
25 TABLET ORAL NIGHTLY
Qty: 30 TABLET | Refills: 3 | Status: ON HOLD | OUTPATIENT
Start: 2018-10-04 | End: 2018-11-12 | Stop reason: HOSPADM

## 2018-10-08 LAB
BUN BLDV-MCNC: 17 MG/DL (ref 7–24)
CALCIUM SERPL-MCNC: 8.3 MG/DL (ref 8.5–10.5)
CHLORIDE BLD-SCNC: 109 MMOL/L (ref 98–107)
CO2: 24 MMOL/L (ref 21–32)
CREAT SERPL-MCNC: 1.09 MG/DL (ref 0.55–1.02)
GFR AFRICAN AMERICAN: > 60 ML/MIN
GFR SERPL CREATININE-BSD FRML MDRD: 50 ML/MIN/
GLUCOSE: 96 MG/DL (ref 65–99)
POTASSIUM SERPL-SCNC: 4.3 MMOL/L (ref 3.5–5.1)
SODIUM BLD-SCNC: 142 MMOL/L (ref 136–145)

## 2018-10-09 ENCOUNTER — PREP FOR PROCEDURE (OUTPATIENT)
Dept: CARDIOTHORACIC SURGERY | Age: 68
End: 2018-10-09

## 2018-10-09 ENCOUNTER — INITIAL CONSULT (OUTPATIENT)
Dept: CARDIOTHORACIC SURGERY | Age: 68
End: 2018-10-09
Payer: MEDICARE

## 2018-10-09 VITALS
BODY MASS INDEX: 29.38 KG/M2 | HEART RATE: 72 BPM | DIASTOLIC BLOOD PRESSURE: 68 MMHG | HEIGHT: 67 IN | WEIGHT: 187.2 LBS | SYSTOLIC BLOOD PRESSURE: 120 MMHG

## 2018-10-09 DIAGNOSIS — I34.0 NON-RHEUMATIC MITRAL REGURGITATION: Primary | ICD-10-CM

## 2018-10-09 DIAGNOSIS — I34.0 MITRAL VALVE INSUFFICIENCY, UNSPECIFIED ETIOLOGY: Primary | ICD-10-CM

## 2018-10-09 DIAGNOSIS — I48.91 ATRIAL FIBRILLATION WITH SLOW VENTRICULAR RESPONSE (HCC): ICD-10-CM

## 2018-10-09 PROCEDURE — 3017F COLORECTAL CA SCREEN DOC REV: CPT | Performed by: THORACIC SURGERY (CARDIOTHORACIC VASCULAR SURGERY)

## 2018-10-09 PROCEDURE — 1090F PRES/ABSN URINE INCON ASSESS: CPT | Performed by: THORACIC SURGERY (CARDIOTHORACIC VASCULAR SURGERY)

## 2018-10-09 PROCEDURE — G8427 DOCREV CUR MEDS BY ELIG CLIN: HCPCS | Performed by: THORACIC SURGERY (CARDIOTHORACIC VASCULAR SURGERY)

## 2018-10-09 PROCEDURE — 4040F PNEUMOC VAC/ADMIN/RCVD: CPT | Performed by: THORACIC SURGERY (CARDIOTHORACIC VASCULAR SURGERY)

## 2018-10-09 PROCEDURE — 1101F PT FALLS ASSESS-DOCD LE1/YR: CPT | Performed by: THORACIC SURGERY (CARDIOTHORACIC VASCULAR SURGERY)

## 2018-10-09 PROCEDURE — G8484 FLU IMMUNIZE NO ADMIN: HCPCS | Performed by: THORACIC SURGERY (CARDIOTHORACIC VASCULAR SURGERY)

## 2018-10-09 PROCEDURE — G8417 CALC BMI ABV UP PARAM F/U: HCPCS | Performed by: THORACIC SURGERY (CARDIOTHORACIC VASCULAR SURGERY)

## 2018-10-09 PROCEDURE — 1036F TOBACCO NON-USER: CPT | Performed by: THORACIC SURGERY (CARDIOTHORACIC VASCULAR SURGERY)

## 2018-10-09 PROCEDURE — 1123F ACP DISCUSS/DSCN MKR DOCD: CPT | Performed by: THORACIC SURGERY (CARDIOTHORACIC VASCULAR SURGERY)

## 2018-10-09 PROCEDURE — 99214 OFFICE O/P EST MOD 30 MIN: CPT | Performed by: THORACIC SURGERY (CARDIOTHORACIC VASCULAR SURGERY)

## 2018-10-09 PROCEDURE — G8400 PT W/DXA NO RESULTS DOC: HCPCS | Performed by: THORACIC SURGERY (CARDIOTHORACIC VASCULAR SURGERY)

## 2018-10-09 RX ORDER — CHLORHEXIDINE GLUCONATE 0.12 MG/ML
15 RINSE ORAL ONCE
Status: CANCELLED | OUTPATIENT
Start: 2018-10-09 | End: 2018-10-09

## 2018-10-09 RX ORDER — SODIUM CHLORIDE 0.9 % (FLUSH) 0.9 %
10 SYRINGE (ML) INJECTION EVERY 12 HOURS SCHEDULED
Status: CANCELLED | OUTPATIENT
Start: 2018-10-09 | End: 2019-10-09

## 2018-10-09 RX ORDER — SODIUM CHLORIDE 9 MG/ML
INJECTION, SOLUTION INTRAVENOUS CONTINUOUS
Status: CANCELLED | OUTPATIENT
Start: 2018-10-09 | End: 2019-10-09

## 2018-10-09 RX ORDER — CHLORHEXIDINE GLUCONATE 4 G/100ML
SOLUTION TOPICAL ONCE
Status: CANCELLED | OUTPATIENT
Start: 2018-10-09 | End: 2018-10-09

## 2018-10-09 RX ORDER — SODIUM CHLORIDE 0.9 % (FLUSH) 0.9 %
10 SYRINGE (ML) INJECTION PRN
Status: CANCELLED | OUTPATIENT
Start: 2018-10-09 | End: 2019-10-09

## 2018-10-09 ASSESSMENT — ENCOUNTER SYMPTOMS
COUGH: 0
SHORTNESS OF BREATH: 1

## 2018-10-12 ENCOUNTER — HOSPITAL ENCOUNTER (OUTPATIENT)
Dept: GENERAL RADIOLOGY | Age: 68
Discharge: HOME | End: 2018-10-14
Payer: MEDICARE

## 2018-10-12 ENCOUNTER — HOSPITAL ENCOUNTER (OUTPATIENT)
Dept: PULMONOLOGY | Age: 68
Discharge: HOME | End: 2018-10-12
Payer: MEDICARE

## 2018-10-12 ENCOUNTER — HOSPITAL ENCOUNTER (OUTPATIENT)
Dept: INTERVENTIONAL RADIOLOGY/VASCULAR | Age: 68
Discharge: HOME | End: 2018-10-14
Payer: MEDICARE

## 2018-10-12 ENCOUNTER — HOSPITAL ENCOUNTER (OUTPATIENT)
Dept: ULTRASOUND IMAGING | Age: 68
Discharge: HOME | End: 2018-10-14
Payer: MEDICARE

## 2018-10-12 ENCOUNTER — ANESTHESIA EVENT (OUTPATIENT)
Dept: OPERATING ROOM | Age: 68
End: 2018-10-12

## 2018-10-12 ENCOUNTER — HOSPITAL ENCOUNTER (OUTPATIENT)
Dept: PREADMISSION TESTING | Age: 68
Discharge: HOME | End: 2018-10-12
Payer: MEDICARE

## 2018-10-12 ENCOUNTER — TELEPHONE (OUTPATIENT)
Dept: CARDIOTHORACIC SURGERY | Age: 68
End: 2018-10-12

## 2018-10-12 VITALS
DIASTOLIC BLOOD PRESSURE: 61 MMHG | HEART RATE: 70 BPM | OXYGEN SATURATION: 99 % | RESPIRATION RATE: 20 BRPM | WEIGHT: 190 LBS | HEIGHT: 67 IN | SYSTOLIC BLOOD PRESSURE: 128 MMHG | TEMPERATURE: 98.1 F | BODY MASS INDEX: 29.82 KG/M2

## 2018-10-12 DIAGNOSIS — I34.0 MITRAL VALVE INSUFFICIENCY, UNSPECIFIED ETIOLOGY: ICD-10-CM

## 2018-10-12 LAB
ABO/RH: NORMAL
ANION GAP SERPL CALCULATED.3IONS-SCNC: 11 MMOL/L (ref 7–16)
ANTIBODY SCREEN: NORMAL
APTT: 43.6 SEC (ref 24.5–35.1)
BACTERIA: ABNORMAL /HPF
BILIRUBIN URINE: NEGATIVE
BLOOD, URINE: ABNORMAL
BUN BLDV-MCNC: 16 MG/DL (ref 8–23)
CALCIUM SERPL-MCNC: 8.4 MG/DL (ref 8.6–10.2)
CHLORIDE BLD-SCNC: 107 MMOL/L (ref 98–107)
CLARITY: CLEAR
CO2: 24 MMOL/L (ref 22–29)
COLOR: YELLOW
CREAT SERPL-MCNC: 0.9 MG/DL (ref 0.5–1)
GFR AFRICAN AMERICAN: >60
GFR NON-AFRICAN AMERICAN: >60 ML/MIN/1.73
GLUCOSE BLD-MCNC: 99 MG/DL (ref 74–109)
GLUCOSE URINE: NEGATIVE MG/DL
HBA1C MFR BLD: 4.3 % (ref 4–5.6)
HCT VFR BLD CALC: 33.1 % (ref 34–48)
HEMOGLOBIN: 10.3 G/DL (ref 11.5–15.5)
INR BLD: 1.5
KETONES, URINE: NEGATIVE MG/DL
LEUKOCYTE ESTERASE, URINE: ABNORMAL
MCH RBC QN AUTO: 31 PG (ref 26–35)
MCHC RBC AUTO-ENTMCNC: 31.1 % (ref 32–34.5)
MCV RBC AUTO: 99.7 FL (ref 80–99.9)
NITRITE, URINE: NEGATIVE
PDW BLD-RTO: 13.4 FL (ref 11.5–15)
PH UA: 5.5 (ref 5–9)
PLATELET # BLD: 296 E9/L (ref 130–450)
PMV BLD AUTO: 9.7 FL (ref 7–12)
POTASSIUM REFLEX MAGNESIUM: 4 MMOL/L (ref 3.5–5)
PROTEIN UA: NEGATIVE MG/DL
PROTHROMBIN TIME: 17.7 SEC (ref 9.3–12.4)
RBC # BLD: 3.32 E12/L (ref 3.5–5.5)
RBC UA: ABNORMAL /HPF (ref 0–2)
SODIUM BLD-SCNC: 142 MMOL/L (ref 132–146)
SPECIFIC GRAVITY UA: 1.01 (ref 1–1.03)
UROBILINOGEN, URINE: 0.2 E.U./DL
WBC # BLD: 12 E9/L (ref 4.5–11.5)
WBC UA: ABNORMAL /HPF (ref 0–5)

## 2018-10-12 PROCEDURE — 86900 BLOOD TYPING SEROLOGIC ABO: CPT

## 2018-10-12 PROCEDURE — 87088 URINE BACTERIA CULTURE: CPT

## 2018-10-12 PROCEDURE — 94729 DIFFUSING CAPACITY: CPT | Performed by: INTERNAL MEDICINE

## 2018-10-12 PROCEDURE — 87081 CULTURE SCREEN ONLY: CPT

## 2018-10-12 PROCEDURE — 80048 BASIC METABOLIC PNL TOTAL CA: CPT

## 2018-10-12 PROCEDURE — 94375 RESPIRATORY FLOW VOLUME LOOP: CPT

## 2018-10-12 PROCEDURE — 93922 UPR/L XTREMITY ART 2 LEVELS: CPT

## 2018-10-12 PROCEDURE — 87077 CULTURE AEROBIC IDENTIFY: CPT

## 2018-10-12 PROCEDURE — 85027 COMPLETE CBC AUTOMATED: CPT

## 2018-10-12 PROCEDURE — 36415 COLL VENOUS BLD VENIPUNCTURE: CPT

## 2018-10-12 PROCEDURE — 94726 PLETHYSMOGRAPHY LUNG VOLUMES: CPT | Performed by: INTERNAL MEDICINE

## 2018-10-12 PROCEDURE — 86901 BLOOD TYPING SEROLOGIC RH(D): CPT

## 2018-10-12 PROCEDURE — 94729 DIFFUSING CAPACITY: CPT

## 2018-10-12 PROCEDURE — 85610 PROTHROMBIN TIME: CPT

## 2018-10-12 PROCEDURE — 81001 URINALYSIS AUTO W/SCOPE: CPT

## 2018-10-12 PROCEDURE — 86850 RBC ANTIBODY SCREEN: CPT

## 2018-10-12 PROCEDURE — 93880 EXTRACRANIAL BILAT STUDY: CPT

## 2018-10-12 PROCEDURE — 94010 BREATHING CAPACITY TEST: CPT | Performed by: INTERNAL MEDICINE

## 2018-10-12 PROCEDURE — 85730 THROMBOPLASTIN TIME PARTIAL: CPT

## 2018-10-12 PROCEDURE — 86923 COMPATIBILITY TEST ELECTRIC: CPT

## 2018-10-12 PROCEDURE — 71046 X-RAY EXAM CHEST 2 VIEWS: CPT

## 2018-10-12 PROCEDURE — 83036 HEMOGLOBIN GLYCOSYLATED A1C: CPT

## 2018-10-12 PROCEDURE — 87186 SC STD MICRODIL/AGAR DIL: CPT

## 2018-10-12 ASSESSMENT — ENCOUNTER SYMPTOMS: SHORTNESS OF BREATH: 1

## 2018-10-12 NOTE — PROGRESS NOTES
given  [x] Incentive Spirometry,coughing & deep breathing exercises reviewed. [x]Medication information sheet(s)   [x]Fluoroscopy-Xray used in surgery reviewed with patient. Educational pamphlet placed in chart. [x]Pain: Post-op pain is normal and to be expected. You will be asked to rate your pain from 0-10(a zero is not acceptable-education is needed). Your post-op pain goal is:  [x] Ask your nurse for your pain medication. [] Joint camp offered. [] Joint replacement booklets given. []Other     MEDICATION INSTRUCTIONS:   [x]Bring a complete list of your medications, please write the last time you took the medicine, give this list to the nurse. [x] Take the following medications the morning of surgery with 1-2 ounces of water: see list  [x] Stop herbal supplements and vitamins 5 days before your surgery. [] DO NOT take any diabetic medicine the morning of surgery. Follow instructions for insulin the day before surgery. [] If you are diabetic and your blood sugar is low or you feel symptomatic, you may drink 1-2 ounces of apple juice or take a glucose tablet. The morning of your procedure, you may call the pre-op area if you have concerns about your blood sugar [phone]. [] Use your inhalers the morning of surgery. Bring your emergency inhaler with you day of surgery. [x] Follow physician instructions regarding any blood thinners you may be taking. WHAT TO EXPECT:  [x] The day of surgery you will be greeted and  checked in by the [facility] [employer] .  A nurse will greet you in accordance to the time you are needed in the pre-op area to prepare you for surgery. Please do not be discouraged if you are not greeted in the order you arrive as there are many variables that are involved in patient preparation. Your patience is greatly appreciated as you wait for your nurse.   Please bring in items such as: books, magazines, newspapers, electronics, or any other items  to occupy your time

## 2018-10-12 NOTE — ANESTHESIA PRE PROCEDURE
differently: Take 20 mg by mouth every morning (before breakfast) Patient taking qod) 60 tablet 3    LORazepam (ATIVAN) 1 MG tablet Take 1 mg by mouth every morning. Gregory Lara atorvastatin (LIPITOR) 10 MG tablet Take 10 mg by mouth every morning       cyanocobalamin 1000 MCG/ML injection Inject 1,000 mcg into the muscle every 30 days       FLUoxetine (PROZAC) 40 MG capsule Take 40 mg by mouth every morning       venlafaxine (EFFEXOR XR) 75 MG extended release capsule Take 75 mg by mouth every morning       omeprazole (PRILOSEC) 40 MG delayed release capsule Take 40 mg by mouth daily as needed       rivaroxaban (XARELTO) 20 MG TABS tablet Take 20 mg by mouth Daily with supper          Allergies:     Allergies   Allergen Reactions    Iv Dye [Iodides]     Pcn [Penicillins]        Problem List:    Patient Active Problem List   Diagnosis Code    Sick sinus syndrome (HCC) I49.5    Persistent atrial fibrillation (HCC) I48.1    Atrial fibrillation with slow ventricular response (HCC) I48.91    Symptomatic bradycardia R00.1    Near syncope R55    Myelodysplastic syndrome (HCC) D46.9    Stroke-like symptom R29.90    History of unsteady gait Z87.898    Pacemaker Z95.0    Non-rheumatic mitral regurgitation I34.0       Past Medical History:        Diagnosis Date    Anemia     Anxiety     Atrial fibrillation (HCC)     Bradycardia     Chest pain     Depression     Dyspnea     GERD (gastroesophageal reflux disease)     Lung nodule     Transaminitis        Past Surgical History:        Procedure Laterality Date    ABDOMEN SURGERY      BARIATRIC SURGERY      CARDIAC CATHETERIZATION  09/28/2018    [facility]-Dr. Guan Chol, AU 7-70    CARDIOVERSION  09/05/2018    Dr. Jillian Wren ENDOSCOPY, COLON, DIAGNOSTIC      PACEMAKER INSERTION  07/13/ 2018    Dual Chamber ([facility]) -     TRANSESOPHAGEAL ECHOCARDIOGRAM  09/05/2018    Dr. Erick Fortune

## 2018-10-13 LAB — MRSA CULTURE ONLY: NORMAL

## 2018-10-14 LAB
ORGANISM: ABNORMAL
URINE CULTURE, ROUTINE: ABNORMAL
URINE CULTURE, ROUTINE: ABNORMAL

## 2018-10-16 ENCOUNTER — OFFICE VISIT (OUTPATIENT)
Dept: CARDIOTHORACIC SURGERY | Age: 68
End: 2018-10-16
Payer: MEDICARE

## 2018-10-16 ENCOUNTER — ANESTHESIA (OUTPATIENT)
Dept: OPERATING ROOM | Age: 68
End: 2018-10-16

## 2018-10-16 VITALS
WEIGHT: 191 LBS | DIASTOLIC BLOOD PRESSURE: 62 MMHG | HEART RATE: 70 BPM | HEIGHT: 67 IN | SYSTOLIC BLOOD PRESSURE: 108 MMHG | BODY MASS INDEX: 29.98 KG/M2

## 2018-10-16 DIAGNOSIS — B96.89 UTI DUE TO KLEBSIELLA SPECIES: Primary | ICD-10-CM

## 2018-10-16 DIAGNOSIS — I34.0 NON-RHEUMATIC MITRAL REGURGITATION: Primary | ICD-10-CM

## 2018-10-16 DIAGNOSIS — I48.19 PERSISTENT ATRIAL FIBRILLATION (HCC): ICD-10-CM

## 2018-10-16 DIAGNOSIS — N39.0 UTI DUE TO KLEBSIELLA SPECIES: Primary | ICD-10-CM

## 2018-10-16 PROCEDURE — 3017F COLORECTAL CA SCREEN DOC REV: CPT | Performed by: THORACIC SURGERY (CARDIOTHORACIC VASCULAR SURGERY)

## 2018-10-16 PROCEDURE — G8400 PT W/DXA NO RESULTS DOC: HCPCS | Performed by: THORACIC SURGERY (CARDIOTHORACIC VASCULAR SURGERY)

## 2018-10-16 PROCEDURE — G8428 CUR MEDS NOT DOCUMENT: HCPCS | Performed by: THORACIC SURGERY (CARDIOTHORACIC VASCULAR SURGERY)

## 2018-10-16 PROCEDURE — 99213 OFFICE O/P EST LOW 20 MIN: CPT | Performed by: THORACIC SURGERY (CARDIOTHORACIC VASCULAR SURGERY)

## 2018-10-16 PROCEDURE — 1036F TOBACCO NON-USER: CPT | Performed by: THORACIC SURGERY (CARDIOTHORACIC VASCULAR SURGERY)

## 2018-10-16 PROCEDURE — 1101F PT FALLS ASSESS-DOCD LE1/YR: CPT | Performed by: THORACIC SURGERY (CARDIOTHORACIC VASCULAR SURGERY)

## 2018-10-16 PROCEDURE — 1123F ACP DISCUSS/DSCN MKR DOCD: CPT | Performed by: THORACIC SURGERY (CARDIOTHORACIC VASCULAR SURGERY)

## 2018-10-16 PROCEDURE — G8484 FLU IMMUNIZE NO ADMIN: HCPCS | Performed by: THORACIC SURGERY (CARDIOTHORACIC VASCULAR SURGERY)

## 2018-10-16 PROCEDURE — 1090F PRES/ABSN URINE INCON ASSESS: CPT | Performed by: THORACIC SURGERY (CARDIOTHORACIC VASCULAR SURGERY)

## 2018-10-16 PROCEDURE — 4040F PNEUMOC VAC/ADMIN/RCVD: CPT | Performed by: THORACIC SURGERY (CARDIOTHORACIC VASCULAR SURGERY)

## 2018-10-16 PROCEDURE — G8417 CALC BMI ABV UP PARAM F/U: HCPCS | Performed by: THORACIC SURGERY (CARDIOTHORACIC VASCULAR SURGERY)

## 2018-10-16 RX ORDER — SULFAMETHOXAZOLE AND TRIMETHOPRIM 800; 160 MG/1; MG/1
1 TABLET ORAL 2 TIMES DAILY
COMMUNITY
End: 2018-10-23 | Stop reason: ALTCHOICE

## 2018-10-16 ASSESSMENT — ENCOUNTER SYMPTOMS
COUGH: 0
SHORTNESS OF BREATH: 0

## 2018-10-16 NOTE — PROGRESS NOTES
evidence of patent foramen ovale.   No evidence of atrial septal defect.      Right Atrium   Normal right atrium.      Mitral Valve   Severe mitral regurgitation is present. No evidence of mitral valve   stenosis.      Tricuspid Valve   The tricuspid valve appears structurally normal.   Moderate tricuspid regurgitation.      Aortic Valve   The aortic valve is trileaflet. No hemodynamically significant aortic   stenosis is present. No evidence of aortic valve regurgitation.      Pulmonic Valve   Pulmonic valve is structurally normal.      Pericardial Effusion   No evidence for hemodynamically significant pericardial effusion.      Aorta   Normal aortic root and ascending aorta.      Conclusions      Summary   Severely dilated left atrium.   No evidence of thrombus within left atrium or appendage.   Overall ejection fraction is low normal .   Normal right ventricle structure and function.   Severe mitral regurgitation is present.   Moderate tricuspid regurgitation.      Signature      ----------------------------------------------------------------   Electronically signed by Bernarda Closs DO(Interpreting   physician) on 2018 11:36 AM   ----------------------------------------------------------------     M-Mode/2D Measurements & Calculations     Doppler Measurements & Calculations      MR Velocity: 5.87 m/s   MV FLOYD PISA: 0.35 cm^2   MR VTI: 219.3 cm   Alias Velocity: 0.33 m/sPISA Radius: 1 cm      PISA area: 6.28 cm^2MR flow rate: 205.98 ml/sMR volume:76.76 ml             2D echocardiogram 3/16/18  JASIEL GUARDADO                                            Phys: AURELIANO CARRILLO DO                                            : 1950 Age: 78      Sex:  F                                            Acct: [de-identified] Loc: 405 2                                               Exam Date: 2018 Status: ADM IN                                            Radiology No: [id number]                                           Consent: The procedure was explained and understood by the patient.         Informed consent was witnessed by RN                 Resting Data        Rest SPECT myocardial perfusion imaging was performed in supine         position 45 minutes following the intravenous injection of 10.3 mCi         of Tc-99m Sestamibi.        Time of rest injection: 1100     Date: 2018        Time of rest imagin     Date: 2018                 Pharmacologic Stress        Pharmacologic stress test was performed by injecting Regadenoson 0.4         mg IV push followed by the intravenous injection of 37.3 mCi of         Tc-99m Sestamibi.        Time of stress injection: 1238     Date: 2018        Time of stress imagin     Date: 2018        Heart Rate at time of stress injection: 77 bpm.        Gated Stress SPECT was performed 45 minutes after stress         injection.        The images were gated to evaluate regional wall motion and calculate         left ventricular ejection fraction.                  Study Quality        Study quality was good.        Artifact: Breast artifact            PAGE 1               Signed Report                     (CONTINUED)                                             Name: GEOJASIEL L                                             Phys: ALYSE ARELLANO MD                                             : 1950 Age: 78      Sex:  Magdaline Heimlich                                        Acct: [de-identified] Loc: CARD                                                 Exam Date: 2018 Status: REG CLI                                             Radiology No: [id number]                                             Unit No: [id number]                          EXAM#     TYPE/EXAM                       RESULT                         [id number] NM/NM MYOCARDIAL PERFUSION COMP SEE REPORT                            <Continued>                     Left Ventricle        Size:         The left

## 2018-10-19 ENCOUNTER — TELEPHONE (OUTPATIENT)
Dept: CARDIOTHORACIC SURGERY | Age: 68
End: 2018-10-19

## 2018-10-19 NOTE — TELEPHONE ENCOUNTER
Pt is out of her Bactrim she wants to know if she should start another prescription? She's allergic to PCN and feels Bactrim may be causing irritation also. She feels she may be getting sick has a sore throat. She doesn't have appointment scheduled w/ ID yet. I spoke to office  they stated Dr Nathalie Simmons had to review pts records before  they would schedule.

## 2018-10-23 ENCOUNTER — OFFICE VISIT (OUTPATIENT)
Dept: NON INVASIVE DIAGNOSTICS | Age: 68
End: 2018-10-23
Payer: MEDICARE

## 2018-10-23 VITALS
BODY MASS INDEX: 29.19 KG/M2 | RESPIRATION RATE: 16 BRPM | WEIGHT: 186 LBS | SYSTOLIC BLOOD PRESSURE: 114 MMHG | DIASTOLIC BLOOD PRESSURE: 60 MMHG | HEART RATE: 74 BPM | HEIGHT: 67 IN

## 2018-10-23 DIAGNOSIS — Z95.0 PACEMAKER: Primary | ICD-10-CM

## 2018-10-23 PROCEDURE — 93280 PM DEVICE PROGR EVAL DUAL: CPT | Performed by: NURSE PRACTITIONER

## 2018-10-23 PROCEDURE — G8427 DOCREV CUR MEDS BY ELIG CLIN: HCPCS | Performed by: NURSE PRACTITIONER

## 2018-10-23 PROCEDURE — 1036F TOBACCO NON-USER: CPT | Performed by: NURSE PRACTITIONER

## 2018-10-23 PROCEDURE — 1090F PRES/ABSN URINE INCON ASSESS: CPT | Performed by: NURSE PRACTITIONER

## 2018-10-23 PROCEDURE — 1101F PT FALLS ASSESS-DOCD LE1/YR: CPT | Performed by: NURSE PRACTITIONER

## 2018-10-23 PROCEDURE — 4040F PNEUMOC VAC/ADMIN/RCVD: CPT | Performed by: NURSE PRACTITIONER

## 2018-10-23 PROCEDURE — 3017F COLORECTAL CA SCREEN DOC REV: CPT | Performed by: NURSE PRACTITIONER

## 2018-10-23 PROCEDURE — G8400 PT W/DXA NO RESULTS DOC: HCPCS | Performed by: NURSE PRACTITIONER

## 2018-10-23 PROCEDURE — G8484 FLU IMMUNIZE NO ADMIN: HCPCS | Performed by: NURSE PRACTITIONER

## 2018-10-23 PROCEDURE — G8417 CALC BMI ABV UP PARAM F/U: HCPCS | Performed by: NURSE PRACTITIONER

## 2018-10-23 PROCEDURE — 1123F ACP DISCUSS/DSCN MKR DOCD: CPT | Performed by: NURSE PRACTITIONER

## 2018-10-23 PROCEDURE — 99213 OFFICE O/P EST LOW 20 MIN: CPT | Performed by: NURSE PRACTITIONER

## 2018-10-23 ASSESSMENT — ENCOUNTER SYMPTOMS: SHORTNESS OF BREATH: 1

## 2018-10-24 NOTE — PROGRESS NOTES
Sclerae are clear. Pupils are equal, round and react to light. The oral mucosa is moist.  Tongue is midline. Her neck is supple. She had mild jugular distention with hepatojugular reflux. Carotids were full. Respirations were unlabored. Her chest was clear. Her heart had a regular rhythm. No gallops were heard. She did have a grade 2/6 holosystolic murmur at the lower left sternal border radiating toward the apex. Abdomen was benign. Extremities showed trace edema bilaterally. I reviewed her electrocardiogram, which showed 100% ventricular pacing. The underlying rhythm could be atrial fibrillation. The patient does appear to be suffering from worsening symptoms of her mitral insufficiency. We will be placing her on furosemide and Losartan to act as a temporizing measure until she can undergo her valve repair or replacement. At the end of the visit, her medications were:   losartan (COZAAR) 25 MG tablet Take 1 tablet by mouth nightly   furosemide (LASIX) 20 MG tablet Take 1 tablet by mouth every morning (before breakfast)   LORazepam (ATIVAN) 1 MG tablet Take 1 mg by mouth every morning. Alexandra Villavicencio atorvastatin (LIPITOR) 10 MG tablet Take 10 mg by mouth every morning    cyanocobalamin 1000 MCG/ML injection Inject 1,000 mcg into the muscle every 30 days    FLUoxetine (PROZAC) 40 MG capsule Take 40 mg by mouth every morning    venlafaxine (EFFEXOR XR) 75 MG extended release capsule Take 75 mg by mouth every morning    omeprazole (PRILOSEC) 40 MG delayed release capsule Take 40 mg by mouth daily as needed    rivaroxaban (XARELTO) 20 MG TABS tablet Take 20 mg by mouth Daily with supper      She was scheduled to have blood work done within a week since she is now on furosemide and Losartan. She is to follow up with [facility] and with infectious disease specialists to determine when her surgery can occur. We will plan on seeing her after surgery.       I thank Dr. Logan Pal for asking our advice

## 2018-10-26 ENCOUNTER — NURSE ONLY (OUTPATIENT)
Dept: NON INVASIVE DIAGNOSTICS | Age: 68
End: 2018-10-26
Payer: MEDICARE

## 2018-10-26 ENCOUNTER — TELEPHONE (OUTPATIENT)
Dept: NON INVASIVE DIAGNOSTICS | Age: 68
End: 2018-10-26

## 2018-10-26 DIAGNOSIS — I49.5 SICK SINUS SYNDROME (HCC): ICD-10-CM

## 2018-10-26 DIAGNOSIS — I48.19 PERSISTENT ATRIAL FIBRILLATION (HCC): ICD-10-CM

## 2018-10-26 DIAGNOSIS — Z95.0 PACEMAKER: Primary | ICD-10-CM

## 2018-10-26 PROCEDURE — 93294 REM INTERROG EVL PM/LDLS PM: CPT | Performed by: INTERNAL MEDICINE

## 2018-10-26 PROCEDURE — 93296 REM INTERROG EVL PM/IDS: CPT | Performed by: INTERNAL MEDICINE

## 2018-10-27 LAB
BLOOD BANK DISPENSE STATUS: NORMAL
BLOOD BANK PRODUCT CODE: NORMAL
BPU ID: NORMAL
DESCRIPTION BLOOD BANK: NORMAL

## 2018-10-29 ENCOUNTER — TELEPHONE (OUTPATIENT)
Dept: CARDIOTHORACIC SURGERY | Age: 68
End: 2018-10-29

## 2018-11-02 ENCOUNTER — PREP FOR PROCEDURE (OUTPATIENT)
Dept: CARDIOTHORACIC SURGERY | Age: 68
End: 2018-11-02

## 2018-11-02 DIAGNOSIS — Z79.01 CHRONIC ANTICOAGULATION: ICD-10-CM

## 2018-11-02 DIAGNOSIS — I34.0 SEVERE MITRAL REGURGITATION: Primary | ICD-10-CM

## 2018-11-02 DIAGNOSIS — Z01.818 PRE-OP TESTING: ICD-10-CM

## 2018-11-02 RX ORDER — SODIUM CHLORIDE 0.9 % (FLUSH) 0.9 %
10 SYRINGE (ML) INJECTION EVERY 12 HOURS SCHEDULED
Status: CANCELLED | OUTPATIENT
Start: 2018-11-02

## 2018-11-02 RX ORDER — CHLORHEXIDINE GLUCONATE 4 G/100ML
SOLUTION TOPICAL ONCE
Status: CANCELLED | OUTPATIENT
Start: 2018-11-02 | End: 2018-11-02

## 2018-11-02 RX ORDER — SODIUM CHLORIDE 9 MG/ML
INJECTION, SOLUTION INTRAVENOUS CONTINUOUS
Status: CANCELLED | OUTPATIENT
Start: 2018-11-02

## 2018-11-02 RX ORDER — CHLORHEXIDINE GLUCONATE 0.12 MG/ML
15 RINSE ORAL ONCE
Status: CANCELLED | OUTPATIENT
Start: 2018-11-02 | End: 2018-11-02

## 2018-11-02 RX ORDER — SODIUM CHLORIDE 0.9 % (FLUSH) 0.9 %
10 SYRINGE (ML) INJECTION PRN
Status: CANCELLED | OUTPATIENT
Start: 2018-11-02

## 2018-11-05 ENCOUNTER — ANESTHESIA EVENT (OUTPATIENT)
Dept: OPERATING ROOM | Age: 68
DRG: 219 | End: 2018-11-05
Payer: MEDICARE

## 2018-11-05 ENCOUNTER — HOSPITAL ENCOUNTER (OUTPATIENT)
Dept: PREADMISSION TESTING | Age: 68
Discharge: HOME | DRG: 219 | End: 2018-11-05
Payer: MEDICARE

## 2018-11-05 VITALS
BODY MASS INDEX: 31.66 KG/M2 | OXYGEN SATURATION: 96 % | RESPIRATION RATE: 12 BRPM | DIASTOLIC BLOOD PRESSURE: 55 MMHG | WEIGHT: 197 LBS | HEIGHT: 66 IN | SYSTOLIC BLOOD PRESSURE: 120 MMHG | TEMPERATURE: 99.1 F | HEART RATE: 70 BPM

## 2018-11-05 DIAGNOSIS — Z01.818 PRE-OP TESTING: ICD-10-CM

## 2018-11-05 DIAGNOSIS — Z79.01 CHRONIC ANTICOAGULATION: ICD-10-CM

## 2018-11-05 DIAGNOSIS — I34.0 SEVERE MITRAL REGURGITATION: ICD-10-CM

## 2018-11-05 LAB
ABO/RH: NORMAL
ANION GAP SERPL CALCULATED.3IONS-SCNC: 12 MMOL/L (ref 7–16)
ANTIBODY SCREEN: NORMAL
APTT: 36.1 SEC (ref 24.5–35.1)
BUN BLDV-MCNC: 20 MG/DL (ref 8–23)
CALCIUM SERPL-MCNC: 8.8 MG/DL (ref 8.6–10.2)
CHLORIDE BLD-SCNC: 104 MMOL/L (ref 98–107)
CO2: 25 MMOL/L (ref 22–29)
CREAT SERPL-MCNC: 1 MG/DL (ref 0.5–1)
GFR AFRICAN AMERICAN: >60
GFR NON-AFRICAN AMERICAN: 55 ML/MIN/1.73
GLUCOSE BLD-MCNC: 105 MG/DL (ref 74–99)
HCT VFR BLD CALC: 32.2 % (ref 34–48)
HEMOGLOBIN: 9.8 G/DL (ref 11.5–15.5)
INR BLD: 1.1
MCH RBC QN AUTO: 30.9 PG (ref 26–35)
MCHC RBC AUTO-ENTMCNC: 30.4 % (ref 32–34.5)
MCV RBC AUTO: 101.6 FL (ref 80–99.9)
PDW BLD-RTO: 14.6 FL (ref 11.5–15)
PLATELET # BLD: 352 E9/L (ref 130–450)
PMV BLD AUTO: 9.5 FL (ref 7–12)
POTASSIUM REFLEX MAGNESIUM: 4.6 MMOL/L (ref 3.5–5)
PROTHROMBIN TIME: 12.7 SEC (ref 9.3–12.4)
RBC # BLD: 3.17 E12/L (ref 3.5–5.5)
SODIUM BLD-SCNC: 141 MMOL/L (ref 132–146)
WBC # BLD: 13.7 E9/L (ref 4.5–11.5)

## 2018-11-05 PROCEDURE — 86900 BLOOD TYPING SEROLOGIC ABO: CPT

## 2018-11-05 PROCEDURE — 85610 PROTHROMBIN TIME: CPT

## 2018-11-05 PROCEDURE — 36415 COLL VENOUS BLD VENIPUNCTURE: CPT

## 2018-11-05 PROCEDURE — 85730 THROMBOPLASTIN TIME PARTIAL: CPT

## 2018-11-05 PROCEDURE — 80048 BASIC METABOLIC PNL TOTAL CA: CPT

## 2018-11-05 PROCEDURE — 86923 COMPATIBILITY TEST ELECTRIC: CPT

## 2018-11-05 PROCEDURE — 86850 RBC ANTIBODY SCREEN: CPT

## 2018-11-05 PROCEDURE — 85027 COMPLETE CBC AUTOMATED: CPT

## 2018-11-05 PROCEDURE — 86901 BLOOD TYPING SEROLOGIC RH(D): CPT

## 2018-11-05 ASSESSMENT — ENCOUNTER SYMPTOMS: SHORTNESS OF BREATH: 1

## 2018-11-05 NOTE — ANESTHESIA PRE PROCEDURE
HCG, HCGQUANT     ABGs: No results found for: PHART, PO2ART, FTF8JGF, RHH4RFW, BEART, B5ODUMEO     Type & Screen (If Applicable):  No results found for: LABABO, LABRH     Echo 09/05/2018    Summary   Severely dilated left atrium.   No evidence of thrombus within left atrium or appendage.   Overall ejection fraction is low normal .   Normal right ventricle structure and function.   Severe mitral regurgitation is present.   Moderate tricuspid regurgitation. EKG 10/04/2018    Electronic ventricular pacemaker   Pacemaker ECG, No further analysis   INSUFFICIENT DATA  Compared to 2/6/1918, a paced rhythm is now present. Carotid US 10/12/2018    Impression   1. Mild atherosclerotic soft and calcified plaque deposition   bilaterally. 2. Suggestion of a focal 50-69% stenosis on the right based on the   submitted velocities. Although this could also be due to vascular   tortuosity. 3. No evidence for significant stenosis on the left.            Anesthesia Evaluation  Patient summary reviewed and Nursing notes reviewed no history of anesthetic complications:   Airway: Mallampati: III  TM distance: >3 FB   Neck ROM: full  Mouth opening: > = 3 FB Dental:    (+) partials  Comment: Partial lower dentures, no other missing, loose, chipped, or cracked teeth per pt    Pulmonary:normal exam    (+) shortness of breath: new,                            ROS comment: Hx: SOB with exertion   Cardiovascular:    (+) valvular problems/murmurs (Hx: MVR, TVR):, pacemaker: pacemaker, dysrhythmias: atrial fibrillation,       ECG reviewed  Rhythm: regular  Rate: normal  Echocardiogram reviewed         Beta Blocker:  Not on Beta Blocker      ROS comment: Hx: Pt stopped taking xarelto Saturday 11/2 for surgery, pacemaker placed July 2018     Neuro/Psych:   (+) depression/anxiety             GI/Hepatic/Renal: Neg GI/Hepatic/Renal ROS            Endo/Other:    (+) blood dyscrasia: anemia, arthritis:., .          Pt had PAT visit.

## 2018-11-06 ENCOUNTER — APPOINTMENT (OUTPATIENT)
Dept: GENERAL RADIOLOGY | Age: 68
DRG: 219 | End: 2018-11-06
Attending: THORACIC SURGERY (CARDIOTHORACIC VASCULAR SURGERY)
Payer: MEDICARE

## 2018-11-06 ENCOUNTER — HOSPITAL ENCOUNTER (INPATIENT)
Age: 68
LOS: 6 days | Discharge: HOME HEALTH | DRG: 219 | End: 2018-11-12
Attending: THORACIC SURGERY (CARDIOTHORACIC VASCULAR SURGERY) | Admitting: THORACIC SURGERY (CARDIOTHORACIC VASCULAR SURGERY)
Payer: MEDICARE

## 2018-11-06 ENCOUNTER — ANESTHESIA (OUTPATIENT)
Dept: OPERATING ROOM | Age: 68
DRG: 219 | End: 2018-11-06
Payer: MEDICARE

## 2018-11-06 VITALS
TEMPERATURE: 99 F | OXYGEN SATURATION: 100 % | SYSTOLIC BLOOD PRESSURE: 134 MMHG | DIASTOLIC BLOOD PRESSURE: 75 MMHG | RESPIRATION RATE: 15 BRPM

## 2018-11-06 DIAGNOSIS — I34.0 SEVERE MITRAL REGURGITATION: Primary | ICD-10-CM

## 2018-11-06 DIAGNOSIS — G89.18 ACUTE POST-OPERATIVE PAIN: ICD-10-CM

## 2018-11-06 PROBLEM — I48.0 PAF (PAROXYSMAL ATRIAL FIBRILLATION) (HCC): Status: ACTIVE | Noted: 2018-07-12

## 2018-11-06 PROBLEM — R06.89 RESPIRATORY INSUFFICIENCY: Status: ACTIVE | Noted: 2018-11-06

## 2018-11-06 PROBLEM — I95.81 POSTOPERATIVE HYPOTENSION: Status: ACTIVE | Noted: 2018-11-06

## 2018-11-06 LAB
AADO2: 116.4 MMHG
AADO2: 168.3 MMHG
AADO2: 93.6 MMHG
ANION GAP SERPL CALCULATED.3IONS-SCNC: 14 MMOL/L (ref 7–16)
APTT: 32.5 SEC (ref 24.5–35.1)
B.E.: -10.5 MMOL/L (ref -3–3)
B.E.: -2.5 MMOL/L (ref -3–0)
B.E.: -3 MMOL/L (ref -3–0)
B.E.: -6 MMOL/L (ref -3–3)
B.E.: -6.2 MMOL/L (ref -3–0)
B.E.: -6.3 MMOL/L (ref -3–3)
B.E.: -6.9 MMOL/L (ref -3–3)
BUN BLDV-MCNC: 17 MG/DL (ref 8–23)
CALCIUM SERPL-MCNC: 7 MG/DL (ref 8.6–10.2)
CARDIOPULMONARY BYPASS: YES
CHLORIDE BLD-SCNC: 111 MMOL/L (ref 98–107)
CO2: 17 MMOL/L (ref 22–29)
COHB: 0 % (ref 0–1.5)
CREAT SERPL-MCNC: 0.8 MG/DL (ref 0.5–1)
CRITICAL: ABNORMAL
DATE ANALYZED: ABNORMAL
DATE OF COLLECTION: ABNORMAL
DEVICE: ABNORMAL
FIO2 ARTERIAL: 60
FIO2 ARTERIAL: 80
FIO2 ARTERIAL: 80
FIO2: 40 %
FIO2: 40 %
FIO2: 50 %
GFR AFRICAN AMERICAN: >60
GFR NON-AFRICAN AMERICAN: >60 ML/MIN/1.73
GLUCOSE BLD-MCNC: 161 MG/DL (ref 74–99)
HCO3 ARTERIAL: 20.1 MMOL/L (ref 22–26)
HCO3 ARTERIAL: 21.6 MMOL/L (ref 22–26)
HCO3 ARTERIAL: 22.3 MMOL/L (ref 22–26)
HCO3: 16.7 MMOL/L (ref 22–26)
HCO3: 18.9 MMOL/L (ref 22–26)
HCO3: 19.4 MMOL/L (ref 22–26)
HCO3: 20 MMOL/L (ref 22–26)
HCT VFR BLD CALC: 32.2 % (ref 34–48)
HCT,ARTERIAL: 23 % (ref 34–48)
HCT,ARTERIAL: 23 % (ref 34–48)
HCT,ARTERIAL: 28 % (ref 34–48)
HEMOGLOBIN: 10 G/DL (ref 11.5–15.5)
HGB, ARTERIAL: 7.9 G/DL (ref 11.5–15.5)
HGB, ARTERIAL: 7.9 G/DL (ref 11.5–15.5)
HGB, ARTERIAL: 9.4 G/DL (ref 11.5–15.5)
HHB: 6.9 % (ref 0–5)
HHB: 7.2 % (ref 0–5)
HHB: 7.6 % (ref 0–5)
HHB: 7.7 % (ref 0–5)
INR BLD: 1.3
LAB: ABNORMAL
MAGNESIUM: 3.1 MG/DL (ref 1.6–2.6)
MCH RBC QN AUTO: 31.3 PG (ref 26–35)
MCHC RBC AUTO-ENTMCNC: 31.1 % (ref 32–34.5)
MCV RBC AUTO: 100.6 FL (ref 80–99.9)
METER GLUCOSE: 133 MG/DL (ref 74–99)
METER GLUCOSE: 150 MG/DL (ref 74–99)
METHB: 0 % (ref 0–1.5)
METHB: 0.1 % (ref 0–1.5)
MODE: ABNORMAL
MODE: ABNORMAL
MODE: AC
MODE: AC
O2 SATURATION: 100 % (ref 92–98.5)
O2 SATURATION: 100 % (ref 92–98.5)
O2 SATURATION: 92.3 % (ref 92–98.5)
O2 SATURATION: 92.4 % (ref 92–98.5)
O2 SATURATION: 92.8 % (ref 92–98.5)
O2 SATURATION: 93.1 % (ref 92–98.5)
O2 SATURATION: 99.7 % (ref 92–98.5)
O2HB: 92.2 % (ref 94–97)
O2HB: 92.3 % (ref 94–97)
O2HB: 92.7 % (ref 94–97)
O2HB: 93.1 % (ref 94–97)
OPERATOR ID: 1868
OPERATOR ID: 187
OPERATOR ID: 4510
PATIENT TEMP: 37 C
PCO2 ARTERIAL: 33.1 MMHG (ref 35–45)
PCO2 ARTERIAL: 40.1 MMHG (ref 35–45)
PCO2 ARTERIAL: 42.5 MMHG (ref 35–45)
PCO2: 39.1 MMHG (ref 35–45)
PCO2: 39.2 MMHG (ref 35–45)
PCO2: 41.4 MMHG (ref 35–45)
PCO2: 42.8 MMHG (ref 35–45)
PDW BLD-RTO: 15.5 FL (ref 11.5–15)
PEEP/CPAP: 5 CMH2O
PFO2: 2.55 MMHG/%
PFO2: 2.78 MMHG/%
PFO2: 3.42 MMHG/%
PH BLOOD GAS: 7.21 (ref 7.35–7.45)
PH BLOOD GAS: 7.28 (ref 7.35–7.45)
PH BLOOD GAS: 7.3 (ref 7.35–7.45)
PH BLOOD GAS: 7.3 (ref 7.35–7.45)
PH BLOOD GAS: 7.31 (ref 7.35–7.45)
PH BLOOD GAS: 7.35 (ref 7.35–7.45)
PH BLOOD GAS: 7.42 (ref 7.35–7.45)
PLATELET # BLD: 232 E9/L (ref 130–450)
PMV BLD AUTO: 9.6 FL (ref 7–12)
PO2 ARTERIAL: 213.9 MMHG (ref 60–80)
PO2 ARTERIAL: 474 MMHG (ref 60–80)
PO2 ARTERIAL: 490.4 MMHG (ref 60–80)
PO2: 111.2 MMHG (ref 60–100)
PO2: 127.6 MMHG (ref 60–100)
PO2: 129.5 MMHG (ref 60–100)
PO2: 136.6 MMHG (ref 60–100)
POTASSIUM SERPL-SCNC: 3.52 MMOL/L (ref 3.3–5.1)
POTASSIUM SERPL-SCNC: 3.74 MMOL/L (ref 3.3–5.1)
POTASSIUM SERPL-SCNC: 3.8 MMOL/L (ref 3.5–5.5)
POTASSIUM SERPL-SCNC: 3.9 MMOL/L (ref 3.5–5)
POTASSIUM SERPL-SCNC: 3.98 MMOL/L (ref 3.3–5.1)
POTASSIUM SERPL-SCNC: 4.8 MMOL/L (ref 3.5–5.5)
POTASSIUM SERPL-SCNC: 4.9 MMOL/L (ref 3.5–5.5)
PROTHROMBIN TIME: 14.6 SEC (ref 9.3–12.4)
PS: 10 CMH20
RBC # BLD: 3.2 E12/L (ref 3.5–5.5)
RI(T): 0.69
RI(T): 1.05
RI(T): 1.32
RR MECHANICAL: 12 B/MIN
RR MECHANICAL: 12 B/MIN
SODIUM BLD-SCNC: 142 MMOL/L (ref 132–146)
SOURCE, BLOOD GAS: ABNORMAL
THB: 10.2 G/DL (ref 11.5–16.5)
THB: 9.3 G/DL (ref 11.5–16.5)
THB: 9.4 G/DL (ref 11.5–16.5)
THB: 9.6 G/DL (ref 11.5–16.5)
TIME ANALYZED: 1613
TIME ANALYZED: 1730
TIME ANALYZED: 1844
TIME ANALYZED: 2125
VT MECHANICAL: 500 ML
VT MECHANICAL: 500 ML
WBC # BLD: 40.7 E9/L (ref 4.5–11.5)

## 2018-11-06 PROCEDURE — P9045 ALBUMIN (HUMAN), 5%, 250 ML: HCPCS | Performed by: THORACIC SURGERY (CARDIOTHORACIC VASCULAR SURGERY)

## 2018-11-06 PROCEDURE — P9045 ALBUMIN (HUMAN), 5%, 250 ML: HCPCS | Performed by: NURSE PRACTITIONER

## 2018-11-06 PROCEDURE — 2500000003 HC RX 250 WO HCPCS: Performed by: NURSE ANESTHETIST, CERTIFIED REGISTERED

## 2018-11-06 PROCEDURE — 2580000003 HC RX 258: Performed by: THORACIC SURGERY (CARDIOTHORACIC VASCULAR SURGERY)

## 2018-11-06 PROCEDURE — 6360000002 HC RX W HCPCS: Performed by: THORACIC SURGERY (CARDIOTHORACIC VASCULAR SURGERY)

## 2018-11-06 PROCEDURE — 6360000002 HC RX W HCPCS

## 2018-11-06 PROCEDURE — 85610 PROTHROMBIN TIME: CPT

## 2018-11-06 PROCEDURE — 6360000002 HC RX W HCPCS: Performed by: ANESTHESIOLOGY

## 2018-11-06 PROCEDURE — B24BZZ4 ULTRASONOGRAPHY OF HEART WITH AORTA, TRANSESOPHAGEAL: ICD-10-PCS | Performed by: ANESTHESIOLOGY

## 2018-11-06 PROCEDURE — 80048 BASIC METABOLIC PNL TOTAL CA: CPT

## 2018-11-06 PROCEDURE — 83735 ASSAY OF MAGNESIUM: CPT

## 2018-11-06 PROCEDURE — 2580000003 HC RX 258: Performed by: NURSE ANESTHETIST, CERTIFIED REGISTERED

## 2018-11-06 PROCEDURE — 5A1221Z PERFORMANCE OF CARDIAC OUTPUT, CONTINUOUS: ICD-10-PCS | Performed by: THORACIC SURGERY (CARDIOTHORACIC VASCULAR SURGERY)

## 2018-11-06 PROCEDURE — 3600000018 HC SURGERY OHS ADDTL 15MIN: Performed by: THORACIC SURGERY (CARDIOTHORACIC VASCULAR SURGERY)

## 2018-11-06 PROCEDURE — 2580000003 HC RX 258

## 2018-11-06 PROCEDURE — 82962 GLUCOSE BLOOD TEST: CPT

## 2018-11-06 PROCEDURE — 71045 X-RAY EXAM CHEST 1 VIEW: CPT

## 2018-11-06 PROCEDURE — 6370000000 HC RX 637 (ALT 250 FOR IP): Performed by: NURSE PRACTITIONER

## 2018-11-06 PROCEDURE — 2709999900 HC NON-CHARGEABLE SUPPLY: Performed by: THORACIC SURGERY (CARDIOTHORACIC VASCULAR SURGERY)

## 2018-11-06 PROCEDURE — P9016 RBC LEUKOCYTES REDUCED: HCPCS

## 2018-11-06 PROCEDURE — 2580000003 HC RX 258: Performed by: NURSE PRACTITIONER

## 2018-11-06 PROCEDURE — 99233 SBSQ HOSP IP/OBS HIGH 50: CPT | Performed by: NURSE PRACTITIONER

## 2018-11-06 PROCEDURE — 0PQ00ZZ REPAIR STERNUM, OPEN APPROACH: ICD-10-PCS | Performed by: THORACIC SURGERY (CARDIOTHORACIC VASCULAR SURGERY)

## 2018-11-06 PROCEDURE — 3700000000 HC ANESTHESIA ATTENDED CARE: Performed by: THORACIC SURGERY (CARDIOTHORACIC VASCULAR SURGERY)

## 2018-11-06 PROCEDURE — 82805 BLOOD GASES W/O2 SATURATION: CPT

## 2018-11-06 PROCEDURE — 6370000000 HC RX 637 (ALT 250 FOR IP)

## 2018-11-06 PROCEDURE — 02UG0JZ SUPPLEMENT MITRAL VALVE WITH SYNTHETIC SUBSTITUTE, OPEN APPROACH: ICD-10-PCS | Performed by: THORACIC SURGERY (CARDIOTHORACIC VASCULAR SURGERY)

## 2018-11-06 PROCEDURE — 2000000000 HC ICU R&B

## 2018-11-06 PROCEDURE — 82803 BLOOD GASES ANY COMBINATION: CPT

## 2018-11-06 PROCEDURE — 85730 THROMBOPLASTIN TIME PARTIAL: CPT

## 2018-11-06 PROCEDURE — 94002 VENT MGMT INPAT INIT DAY: CPT

## 2018-11-06 PROCEDURE — 2700000000 HC OXYGEN THERAPY PER DAY

## 2018-11-06 PROCEDURE — 2720000010 HC SURG SUPPLY STERILE: Performed by: THORACIC SURGERY (CARDIOTHORACIC VASCULAR SURGERY)

## 2018-11-06 PROCEDURE — 94799 UNLISTED PULMONARY SVC/PX: CPT

## 2018-11-06 PROCEDURE — 02580ZZ DESTRUCTION OF CONDUCTION MECHANISM, OPEN APPROACH: ICD-10-PCS | Performed by: THORACIC SURGERY (CARDIOTHORACIC VASCULAR SURGERY)

## 2018-11-06 PROCEDURE — 99232 SBSQ HOSP IP/OBS MODERATE 35: CPT | Performed by: INTERNAL MEDICINE

## 2018-11-06 PROCEDURE — 02UJ0JZ SUPPLEMENT TRICUSPID VALVE WITH SYNTHETIC SUBSTITUTE, OPEN APPROACH: ICD-10-PCS | Performed by: THORACIC SURGERY (CARDIOTHORACIC VASCULAR SURGERY)

## 2018-11-06 PROCEDURE — 3600000008 HC SURGERY OHS BASE: Performed by: THORACIC SURGERY (CARDIOTHORACIC VASCULAR SURGERY)

## 2018-11-06 PROCEDURE — 2500000003 HC RX 250 WO HCPCS

## 2018-11-06 PROCEDURE — 6360000002 HC RX W HCPCS: Performed by: NURSE PRACTITIONER

## 2018-11-06 PROCEDURE — 6370000000 HC RX 637 (ALT 250 FOR IP): Performed by: THORACIC SURGERY (CARDIOTHORACIC VASCULAR SURGERY)

## 2018-11-06 PROCEDURE — 6360000002 HC RX W HCPCS: Performed by: NURSE ANESTHETIST, CERTIFIED REGISTERED

## 2018-11-06 PROCEDURE — 33259 ABLATE ATRIA W/BYPASS ADD-ON: CPT | Performed by: THORACIC SURGERY (CARDIOTHORACIC VASCULAR SURGERY)

## 2018-11-06 PROCEDURE — 33426 REPAIR OF MITRAL VALVE: CPT | Performed by: THORACIC SURGERY (CARDIOTHORACIC VASCULAR SURGERY)

## 2018-11-06 PROCEDURE — 33464 VALVULOPLASTY TRICUSPID: CPT | Performed by: THORACIC SURGERY (CARDIOTHORACIC VASCULAR SURGERY)

## 2018-11-06 PROCEDURE — 36415 COLL VENOUS BLD VENIPUNCTURE: CPT

## 2018-11-06 PROCEDURE — 02L70CK OCCLUSION OF LEFT ATRIAL APPENDAGE WITH EXTRALUMINAL DEVICE, OPEN APPROACH: ICD-10-PCS | Performed by: THORACIC SURGERY (CARDIOTHORACIC VASCULAR SURGERY)

## 2018-11-06 PROCEDURE — 88305 TISSUE EXAM BY PATHOLOGIST: CPT

## 2018-11-06 PROCEDURE — 2500000003 HC RX 250 WO HCPCS: Performed by: NURSE PRACTITIONER

## 2018-11-06 PROCEDURE — 2780000006 HC MISC HEART VALVE: Performed by: THORACIC SURGERY (CARDIOTHORACIC VASCULAR SURGERY)

## 2018-11-06 PROCEDURE — 2780000010 HC IMPLANT OTHER: Performed by: THORACIC SURGERY (CARDIOTHORACIC VASCULAR SURGERY)

## 2018-11-06 PROCEDURE — 3700000001 HC ADD 15 MINUTES (ANESTHESIA): Performed by: THORACIC SURGERY (CARDIOTHORACIC VASCULAR SURGERY)

## 2018-11-06 PROCEDURE — 85027 COMPLETE CBC AUTOMATED: CPT

## 2018-11-06 PROCEDURE — 84132 ASSAY OF SERUM POTASSIUM: CPT

## 2018-11-06 DEVICE — ZINACTIVE USE 2540330 DEVICE OCCL CLP L35MM PLUNG GRP FLX SHFT FOR GILLINOV: Type: IMPLANTABLE DEVICE | Site: HEART | Status: FUNCTIONAL

## 2018-11-06 DEVICE — RING ANNULO HEART MITRAL PHYSIO 28MM: Type: IMPLANTABLE DEVICE | Site: HEART | Status: FUNCTIONAL

## 2018-11-06 DEVICE — RING ANNULPLSTY OD28MM TRICSP CNTOUR 3D: Type: IMPLANTABLE DEVICE | Site: HEART | Status: FUNCTIONAL

## 2018-11-06 RX ORDER — 0.9 % SODIUM CHLORIDE 0.9 %
250 INTRAVENOUS SOLUTION INTRAVENOUS CONTINUOUS PRN
Status: DISCONTINUED | OUTPATIENT
Start: 2018-11-06 | End: 2018-11-07

## 2018-11-06 RX ORDER — MAGNESIUM HYDROXIDE/ALUMINUM HYDROXICE/SIMETHICONE 120; 1200; 1200 MG/30ML; MG/30ML; MG/30ML
30 SUSPENSION ORAL EVERY 4 HOURS PRN
Status: DISCONTINUED | OUTPATIENT
Start: 2018-11-06 | End: 2018-11-07

## 2018-11-06 RX ORDER — OXYCODONE HYDROCHLORIDE AND ACETAMINOPHEN 5; 325 MG/1; MG/1
2 TABLET ORAL EVERY 4 HOURS PRN
Status: DISCONTINUED | OUTPATIENT
Start: 2018-11-06 | End: 2018-11-07

## 2018-11-06 RX ORDER — VANCOMYCIN HYDROCHLORIDE 1 G/20ML
1 INJECTION, POWDER, LYOPHILIZED, FOR SOLUTION INTRAVENOUS ONCE
Status: DISCONTINUED | OUTPATIENT
Start: 2018-11-06 | End: 2018-11-06 | Stop reason: SDUPTHER

## 2018-11-06 RX ORDER — GLYCOPYRROLATE 1 MG/5 ML
SYRINGE (ML) INTRAVENOUS PRN
Status: DISCONTINUED | OUTPATIENT
Start: 2018-11-06 | End: 2018-11-06 | Stop reason: SDUPTHER

## 2018-11-06 RX ORDER — FENTANYL CITRATE 50 UG/ML
25 INJECTION, SOLUTION INTRAMUSCULAR; INTRAVENOUS
Status: DISCONTINUED | OUTPATIENT
Start: 2018-11-06 | End: 2018-11-07

## 2018-11-06 RX ORDER — DEXTROSE MONOHYDRATE 50 MG/ML
100 INJECTION, SOLUTION INTRAVENOUS PRN
Status: DISCONTINUED | OUTPATIENT
Start: 2018-11-06 | End: 2018-11-07

## 2018-11-06 RX ORDER — NITROGLYCERIN 5 MG/ML
INJECTION, SOLUTION INTRAVENOUS PRN
Status: DISCONTINUED | OUTPATIENT
Start: 2018-11-06 | End: 2018-11-06 | Stop reason: SDUPTHER

## 2018-11-06 RX ORDER — ALBUMIN, HUMAN INJ 5% 5 %
25 SOLUTION INTRAVENOUS ONCE
Status: COMPLETED | OUTPATIENT
Start: 2018-11-06 | End: 2018-11-06

## 2018-11-06 RX ORDER — SODIUM CHLORIDE, SODIUM LACTATE, POTASSIUM CHLORIDE, CALCIUM CHLORIDE 600; 310; 30; 20 MG/100ML; MG/100ML; MG/100ML; MG/100ML
INJECTION, SOLUTION INTRAVENOUS CONTINUOUS PRN
Status: DISCONTINUED | OUTPATIENT
Start: 2018-11-06 | End: 2018-11-06 | Stop reason: SDUPTHER

## 2018-11-06 RX ORDER — ETOMIDATE 2 MG/ML
INJECTION, SOLUTION INTRAVENOUS PRN
Status: DISCONTINUED | OUTPATIENT
Start: 2018-11-06 | End: 2018-11-06 | Stop reason: SDUPTHER

## 2018-11-06 RX ORDER — PROPOFOL 10 MG/ML
INJECTION, EMULSION INTRAVENOUS
Status: DISCONTINUED
Start: 2018-11-06 | End: 2018-11-07

## 2018-11-06 RX ORDER — ASPIRIN 81 MG/1
81 TABLET ORAL DAILY
Status: DISCONTINUED | OUTPATIENT
Start: 2018-11-07 | End: 2018-11-07

## 2018-11-06 RX ORDER — PROTAMINE SULFATE 10 MG/ML
INJECTION, SOLUTION INTRAVENOUS PRN
Status: DISCONTINUED | OUTPATIENT
Start: 2018-11-06 | End: 2018-11-06 | Stop reason: SDUPTHER

## 2018-11-06 RX ORDER — ONDANSETRON 2 MG/ML
4 INJECTION INTRAMUSCULAR; INTRAVENOUS EVERY 8 HOURS PRN
Status: DISCONTINUED | OUTPATIENT
Start: 2018-11-06 | End: 2018-11-07

## 2018-11-06 RX ORDER — FLUOXETINE HYDROCHLORIDE 20 MG/1
40 CAPSULE ORAL EVERY MORNING
Status: DISCONTINUED | OUTPATIENT
Start: 2018-11-07 | End: 2018-11-12 | Stop reason: HOSPADM

## 2018-11-06 RX ORDER — NITROGLYCERIN 20 MG/100ML
10 INJECTION INTRAVENOUS CONTINUOUS PRN
Status: DISCONTINUED | OUTPATIENT
Start: 2018-11-06 | End: 2018-11-07

## 2018-11-06 RX ORDER — VENLAFAXINE HYDROCHLORIDE 75 MG/1
75 CAPSULE, EXTENDED RELEASE ORAL EVERY MORNING
Status: DISCONTINUED | OUTPATIENT
Start: 2018-11-07 | End: 2018-11-12 | Stop reason: HOSPADM

## 2018-11-06 RX ORDER — CHLORHEXIDINE GLUCONATE 4 G/100ML
SOLUTION TOPICAL ONCE
Status: DISCONTINUED | OUTPATIENT
Start: 2018-11-06 | End: 2018-11-06 | Stop reason: HOSPADM

## 2018-11-06 RX ORDER — FENTANYL CITRATE 50 UG/ML
50 INJECTION, SOLUTION INTRAMUSCULAR; INTRAVENOUS PRN
Status: COMPLETED | OUTPATIENT
Start: 2018-11-06 | End: 2018-11-06

## 2018-11-06 RX ORDER — ACETAMINOPHEN 650 MG/1
650 SUPPOSITORY RECTAL EVERY 4 HOURS PRN
Status: DISCONTINUED | OUTPATIENT
Start: 2018-11-06 | End: 2018-11-07

## 2018-11-06 RX ORDER — POTASSIUM CHLORIDE 29.8 MG/ML
20 INJECTION INTRAVENOUS PRN
Status: DISCONTINUED | OUTPATIENT
Start: 2018-11-06 | End: 2018-11-07

## 2018-11-06 RX ORDER — MIDAZOLAM HYDROCHLORIDE 1 MG/ML
2 INJECTION INTRAMUSCULAR; INTRAVENOUS ONCE
Status: COMPLETED | OUTPATIENT
Start: 2018-11-06 | End: 2018-11-06

## 2018-11-06 RX ORDER — CHLORHEXIDINE GLUCONATE 0.12 MG/ML
15 RINSE ORAL ONCE
Status: COMPLETED | OUTPATIENT
Start: 2018-11-06 | End: 2018-11-06

## 2018-11-06 RX ORDER — PANTOPRAZOLE SODIUM 40 MG/10ML
40 INJECTION, POWDER, LYOPHILIZED, FOR SOLUTION INTRAVENOUS DAILY
Status: DISCONTINUED | OUTPATIENT
Start: 2018-11-07 | End: 2018-11-07

## 2018-11-06 RX ORDER — MEPERIDINE HYDROCHLORIDE 50 MG/ML
25 INJECTION INTRAMUSCULAR; INTRAVENOUS; SUBCUTANEOUS
Status: ACTIVE | OUTPATIENT
Start: 2018-11-06 | End: 2018-11-06

## 2018-11-06 RX ORDER — DOCUSATE SODIUM 100 MG/1
100 CAPSULE, LIQUID FILLED ORAL 2 TIMES DAILY PRN
Status: DISCONTINUED | OUTPATIENT
Start: 2018-11-06 | End: 2018-11-07

## 2018-11-06 RX ORDER — LANOLIN ALCOHOL/MO/W.PET/CERES
400 CREAM (GRAM) TOPICAL DAILY
Status: DISCONTINUED | OUTPATIENT
Start: 2018-11-06 | End: 2018-11-12 | Stop reason: HOSPADM

## 2018-11-06 RX ORDER — MAGNESIUM SULFATE IN WATER 40 MG/ML
2 INJECTION, SOLUTION INTRAVENOUS PRN
Status: DISCONTINUED | OUTPATIENT
Start: 2018-11-06 | End: 2018-11-07

## 2018-11-06 RX ORDER — SODIUM CHLORIDE 0.9 % (FLUSH) 0.9 %
10 SYRINGE (ML) INJECTION PRN
Status: DISCONTINUED | OUTPATIENT
Start: 2018-11-06 | End: 2018-11-12 | Stop reason: HOSPADM

## 2018-11-06 RX ORDER — HEPARIN SODIUM 10000 [USP'U]/ML
INJECTION, SOLUTION INTRAVENOUS; SUBCUTANEOUS PRN
Status: DISCONTINUED | OUTPATIENT
Start: 2018-11-06 | End: 2018-11-06 | Stop reason: SDUPTHER

## 2018-11-06 RX ORDER — SODIUM CHLORIDE 9 MG/ML
INJECTION, SOLUTION INTRAVENOUS CONTINUOUS
Status: DISCONTINUED | OUTPATIENT
Start: 2018-11-06 | End: 2018-11-06

## 2018-11-06 RX ORDER — NICOTINE POLACRILEX 4 MG
15 LOZENGE BUCCAL PRN
Status: DISCONTINUED | OUTPATIENT
Start: 2018-11-06 | End: 2018-11-12 | Stop reason: HOSPADM

## 2018-11-06 RX ORDER — NEOSTIGMINE METHYLSULFATE 1 MG/ML
INJECTION, SOLUTION INTRAVENOUS PRN
Status: DISCONTINUED | OUTPATIENT
Start: 2018-11-06 | End: 2018-11-06 | Stop reason: SDUPTHER

## 2018-11-06 RX ORDER — ASPIRIN 300 MG/1
300 SUPPOSITORY RECTAL ONCE
Status: COMPLETED | OUTPATIENT
Start: 2018-11-06 | End: 2018-11-06

## 2018-11-06 RX ORDER — FENTANYL CITRATE 0.05 MG/ML
INJECTION, SOLUTION INTRAMUSCULAR; INTRAVENOUS PRN
Status: DISCONTINUED | OUTPATIENT
Start: 2018-11-06 | End: 2018-11-06 | Stop reason: SDUPTHER

## 2018-11-06 RX ORDER — PROPOFOL 10 MG/ML
10 INJECTION, EMULSION INTRAVENOUS CONTINUOUS PRN
Status: DISCONTINUED | OUTPATIENT
Start: 2018-11-06 | End: 2018-11-07

## 2018-11-06 RX ORDER — SODIUM CHLORIDE 0.9 % (FLUSH) 0.9 %
10 SYRINGE (ML) INJECTION EVERY 12 HOURS SCHEDULED
Status: DISCONTINUED | OUTPATIENT
Start: 2018-11-06 | End: 2018-11-12 | Stop reason: HOSPADM

## 2018-11-06 RX ORDER — SODIUM CHLORIDE 9 MG/ML
30 INJECTION, SOLUTION INTRAVENOUS CONTINUOUS
Status: DISCONTINUED | OUTPATIENT
Start: 2018-11-06 | End: 2018-11-07

## 2018-11-06 RX ORDER — DEXTROSE MONOHYDRATE 25 G/50ML
12.5 INJECTION, SOLUTION INTRAVENOUS PRN
Status: DISCONTINUED | OUTPATIENT
Start: 2018-11-06 | End: 2018-11-12 | Stop reason: HOSPADM

## 2018-11-06 RX ORDER — MORPHINE SULFATE 2 MG/ML
2 INJECTION, SOLUTION INTRAMUSCULAR; INTRAVENOUS
Status: DISCONTINUED | OUTPATIENT
Start: 2018-11-06 | End: 2018-11-07

## 2018-11-06 RX ORDER — 0.9 % SODIUM CHLORIDE 0.9 %
10 VIAL (ML) INJECTION DAILY
Status: DISCONTINUED | OUTPATIENT
Start: 2018-11-07 | End: 2018-11-07

## 2018-11-06 RX ORDER — VECURONIUM BROMIDE 1 MG/ML
INJECTION, POWDER, LYOPHILIZED, FOR SOLUTION INTRAVENOUS PRN
Status: DISCONTINUED | OUTPATIENT
Start: 2018-11-06 | End: 2018-11-06 | Stop reason: SDUPTHER

## 2018-11-06 RX ORDER — LIDOCAINE HYDROCHLORIDE 20 MG/ML
INJECTION, SOLUTION INFILTRATION; PERINEURAL PRN
Status: DISCONTINUED | OUTPATIENT
Start: 2018-11-06 | End: 2018-11-06 | Stop reason: SDUPTHER

## 2018-11-06 RX ORDER — SODIUM CHLORIDE 0.9 % (FLUSH) 0.9 %
10 SYRINGE (ML) INJECTION PRN
Status: DISCONTINUED | OUTPATIENT
Start: 2018-11-06 | End: 2018-11-06 | Stop reason: HOSPADM

## 2018-11-06 RX ORDER — INSULIN GLARGINE 100 [IU]/ML
0.15 INJECTION, SOLUTION SUBCUTANEOUS NIGHTLY
Status: DISCONTINUED | OUTPATIENT
Start: 2018-11-07 | End: 2018-11-07

## 2018-11-06 RX ORDER — AMINOCAPROIC ACID 250 MG/ML
INJECTION, SOLUTION INTRAVENOUS PRN
Status: DISCONTINUED | OUTPATIENT
Start: 2018-11-06 | End: 2018-11-06 | Stop reason: SDUPTHER

## 2018-11-06 RX ORDER — OXYCODONE HYDROCHLORIDE AND ACETAMINOPHEN 5; 325 MG/1; MG/1
1 TABLET ORAL EVERY 4 HOURS PRN
Status: DISCONTINUED | OUTPATIENT
Start: 2018-11-06 | End: 2018-11-07

## 2018-11-06 RX ORDER — CHLORHEXIDINE GLUCONATE 0.12 MG/ML
15 RINSE ORAL 2 TIMES DAILY
Status: DISCONTINUED | OUTPATIENT
Start: 2018-11-06 | End: 2018-11-06

## 2018-11-06 RX ORDER — ALBUMIN, HUMAN INJ 5% 5 %
25 SOLUTION INTRAVENOUS PRN
Status: DISCONTINUED | OUTPATIENT
Start: 2018-11-06 | End: 2018-11-07

## 2018-11-06 RX ORDER — SODIUM CHLORIDE 0.9 % (FLUSH) 0.9 %
10 SYRINGE (ML) INJECTION EVERY 12 HOURS SCHEDULED
Status: DISCONTINUED | OUTPATIENT
Start: 2018-11-06 | End: 2018-11-06 | Stop reason: HOSPADM

## 2018-11-06 RX ORDER — AMIODARONE HYDROCHLORIDE 50 MG/ML
INJECTION, SOLUTION INTRAVENOUS PRN
Status: DISCONTINUED | OUTPATIENT
Start: 2018-11-06 | End: 2018-11-06 | Stop reason: SDUPTHER

## 2018-11-06 RX ORDER — ACETAMINOPHEN 325 MG/1
650 TABLET ORAL EVERY 4 HOURS PRN
Status: DISCONTINUED | OUTPATIENT
Start: 2018-11-06 | End: 2018-11-07

## 2018-11-06 RX ORDER — ATORVASTATIN CALCIUM 10 MG/1
10 TABLET, FILM COATED ORAL EVERY MORNING
Status: DISCONTINUED | OUTPATIENT
Start: 2018-11-07 | End: 2018-11-12 | Stop reason: HOSPADM

## 2018-11-06 RX ORDER — VANCOMYCIN HYDROCHLORIDE 1 G/20ML
INJECTION, POWDER, LYOPHILIZED, FOR SOLUTION INTRAVENOUS PRN
Status: DISCONTINUED | OUTPATIENT
Start: 2018-11-06 | End: 2018-11-06 | Stop reason: SDUPTHER

## 2018-11-06 RX ADMIN — CEFAZOLIN SODIUM 2 G: 10 INJECTION, POWDER, FOR SOLUTION INTRAVENOUS at 17:06

## 2018-11-06 RX ADMIN — LIDOCAINE HYDROCHLORIDE 100 MG: 20 INJECTION, SOLUTION INFILTRATION; PERINEURAL at 12:10

## 2018-11-06 RX ADMIN — Medication 10 ML: at 21:18

## 2018-11-06 RX ADMIN — SODIUM CHLORIDE, POTASSIUM CHLORIDE, SODIUM LACTATE AND CALCIUM CHLORIDE: 600; 310; 30; 20 INJECTION, SOLUTION INTRAVENOUS at 11:50

## 2018-11-06 RX ADMIN — PROPOFOL 10 MCG/KG/MIN: 10 INJECTION, EMULSION INTRAVENOUS at 15:45

## 2018-11-06 RX ADMIN — POTASSIUM CHLORIDE 20 MEQ: 400 INJECTION, SOLUTION INTRAVENOUS at 22:10

## 2018-11-06 RX ADMIN — AMINOCAPROIC ACID 1 G/HR: 250 INJECTION, SOLUTION INTRAVENOUS at 12:21

## 2018-11-06 RX ADMIN — FENTANYL CITRATE 100 MCG: 50 INJECTION, SOLUTION INTRAMUSCULAR; INTRAVENOUS at 14:39

## 2018-11-06 RX ADMIN — VECURONIUM BROMIDE FOR INJECTION 3 MG: 1 INJECTION, POWDER, LYOPHILIZED, FOR SOLUTION INTRAVENOUS at 13:12

## 2018-11-06 RX ADMIN — FENTANYL CITRATE 150 MCG: 50 INJECTION, SOLUTION INTRAMUSCULAR; INTRAVENOUS at 15:02

## 2018-11-06 RX ADMIN — FENTANYL CITRATE 25 MCG: 50 INJECTION, SOLUTION INTRAMUSCULAR; INTRAVENOUS at 20:35

## 2018-11-06 RX ADMIN — VANCOMYCIN HYDROCHLORIDE 1500 MG: 1 INJECTION, POWDER, LYOPHILIZED, FOR SOLUTION INTRAVENOUS at 12:19

## 2018-11-06 RX ADMIN — MIDAZOLAM HYDROCHLORIDE 2 MG: 1 INJECTION, SOLUTION INTRAMUSCULAR; INTRAVENOUS at 11:26

## 2018-11-06 RX ADMIN — INSULIN LISPRO 3 UNITS: 100 INJECTION, SOLUTION INTRAVENOUS; SUBCUTANEOUS at 19:56

## 2018-11-06 RX ADMIN — EPINEPHRINE 0.02 MCG/KG/MIN: 1 INJECTION INTRAMUSCULAR; INTRAVENOUS; SUBCUTANEOUS at 14:00

## 2018-11-06 RX ADMIN — PHENYLEPHRINE HYDROCHLORIDE 100 MCG: 10 INJECTION INTRAVENOUS at 12:49

## 2018-11-06 RX ADMIN — PROTAMINE SULFATE 300 MG: 10 INJECTION, SOLUTION INTRAVENOUS at 14:35

## 2018-11-06 RX ADMIN — FENTANYL CITRATE 500 MCG: 50 INJECTION, SOLUTION INTRAMUSCULAR; INTRAVENOUS at 12:10

## 2018-11-06 RX ADMIN — VECURONIUM BROMIDE FOR INJECTION 10 MG: 1 INJECTION, POWDER, LYOPHILIZED, FOR SOLUTION INTRAVENOUS at 12:10

## 2018-11-06 RX ADMIN — POTASSIUM CHLORIDE 20 MEQ: 400 INJECTION, SOLUTION INTRAVENOUS at 17:39

## 2018-11-06 RX ADMIN — FENTANYL CITRATE 250 MCG: 50 INJECTION, SOLUTION INTRAMUSCULAR; INTRAVENOUS at 12:32

## 2018-11-06 RX ADMIN — PHENYLEPHRINE HYDROCHLORIDE 100 MCG: 10 INJECTION INTRAVENOUS at 14:54

## 2018-11-06 RX ADMIN — SODIUM CHLORIDE 30 ML/HR: 9 INJECTION, SOLUTION INTRAVENOUS at 16:58

## 2018-11-06 RX ADMIN — POTASSIUM CHLORIDE 20 MEQ: 400 INJECTION, SOLUTION INTRAVENOUS at 19:46

## 2018-11-06 RX ADMIN — FENTANYL CITRATE 50 MCG: 50 INJECTION, SOLUTION INTRAMUSCULAR; INTRAVENOUS at 11:25

## 2018-11-06 RX ADMIN — Medication 0.6 MG: at 15:42

## 2018-11-06 RX ADMIN — SODIUM CHLORIDE, POTASSIUM CHLORIDE, SODIUM LACTATE AND CALCIUM CHLORIDE: 600; 310; 30; 20 INJECTION, SOLUTION INTRAVENOUS at 11:51

## 2018-11-06 RX ADMIN — MUPIROCIN: 20 OINTMENT TOPICAL at 21:17

## 2018-11-06 RX ADMIN — SODIUM BICARBONATE 100 MEQ: 84 INJECTION, SOLUTION INTRAVENOUS at 17:04

## 2018-11-06 RX ADMIN — CEFAZOLIN SODIUM 2 G: 10 INJECTION, POWDER, FOR SOLUTION INTRAVENOUS at 23:52

## 2018-11-06 RX ADMIN — FENTANYL CITRATE 50 MCG: 50 INJECTION, SOLUTION INTRAMUSCULAR; INTRAVENOUS at 11:29

## 2018-11-06 RX ADMIN — ETOMIDATE 14 MG: 2 INJECTION, SOLUTION INTRAVENOUS at 12:10

## 2018-11-06 RX ADMIN — Medication 3 MG: at 15:42

## 2018-11-06 RX ADMIN — ALBUMIN (HUMAN) 25 G: 12.5 INJECTION, SOLUTION INTRAVENOUS at 16:18

## 2018-11-06 RX ADMIN — PHENYLEPHRINE HYDROCHLORIDE 100 MCG: 10 INJECTION INTRAVENOUS at 12:43

## 2018-11-06 RX ADMIN — CHLORHEXIDINE GLUCONATE 0.12% ORAL RINSE 15 ML: 1.2 LIQUID ORAL at 10:18

## 2018-11-06 RX ADMIN — AMIODARONE HYDROCHLORIDE 150 MG: 50 INJECTION, SOLUTION INTRAVENOUS at 13:54

## 2018-11-06 RX ADMIN — ALBUMIN (HUMAN) 12.5 G: 12.5 INJECTION, SOLUTION INTRAVENOUS at 22:05

## 2018-11-06 RX ADMIN — AMINOCAPROIC ACID 5000 MG: 250 INJECTION, SOLUTION INTRAVENOUS at 12:21

## 2018-11-06 RX ADMIN — AMIODARONE HYDROCHLORIDE 0.5 MG/MIN: 50 INJECTION, SOLUTION INTRAVENOUS at 14:22

## 2018-11-06 RX ADMIN — HEPARIN SODIUM 35000 UNITS: 10000 INJECTION, SOLUTION INTRAVENOUS; SUBCUTANEOUS at 12:39

## 2018-11-06 RX ADMIN — SODIUM CHLORIDE: 9 INJECTION, SOLUTION INTRAVENOUS at 10:09

## 2018-11-06 RX ADMIN — SODIUM CHLORIDE: 9 INJECTION, SOLUTION INTRAVENOUS at 11:51

## 2018-11-06 RX ADMIN — CALCIUM GLUCONATE 2 G: 98 INJECTION, SOLUTION INTRAVENOUS at 17:36

## 2018-11-06 RX ADMIN — AMIODARONE HYDROCHLORIDE 0.5 MG/MIN: 50 INJECTION, SOLUTION INTRAVENOUS at 16:59

## 2018-11-06 RX ADMIN — ASPIRIN 300 MG: 300 SUPPOSITORY RECTAL at 17:14

## 2018-11-06 RX ADMIN — NITROGLYCERIN 50 MCG: 5 INJECTION, SOLUTION INTRAVENOUS at 14:39

## 2018-11-06 ASSESSMENT — PULMONARY FUNCTION TESTS
PIF_VALUE: 17
PIF_VALUE: 2
PIF_VALUE: 1
PIF_VALUE: 20
PIF_VALUE: 23
PIF_VALUE: 22
PIF_VALUE: 18
PIF_VALUE: 1
PIF_VALUE: 1
PIF_VALUE: 18
PIF_VALUE: 17
PIF_VALUE: 19
PIF_VALUE: 0
PIF_VALUE: 0
PIF_VALUE: 26
PIF_VALUE: 3
PIF_VALUE: 1
PIF_VALUE: 19
PIF_VALUE: 2
PIF_VALUE: 18
PIF_VALUE: 19
PIF_VALUE: 27
PIF_VALUE: 21
PIF_VALUE: 1
PIF_VALUE: 21
PIF_VALUE: 18
PIF_VALUE: 2
PIF_VALUE: 16
PIF_VALUE: 0
PIF_VALUE: 17
PIF_VALUE: 1
PIF_VALUE: 17
PIF_VALUE: 17
PIF_VALUE: 21
PIF_VALUE: 0
PIF_VALUE: 18
PIF_VALUE: 18
PIF_VALUE: 21
PIF_VALUE: 17
PIF_VALUE: 20
PIF_VALUE: 21
PIF_VALUE: 29
PIF_VALUE: 20
PIF_VALUE: 21
PIF_VALUE: 3
PIF_VALUE: 21
PIF_VALUE: 1
PIF_VALUE: 22
PIF_VALUE: 1
PIF_VALUE: 1
PIF_VALUE: 15
PIF_VALUE: 22
PIF_VALUE: 1
PIF_VALUE: 18
PIF_VALUE: 2
PIF_VALUE: 19
PIF_VALUE: 18
PIF_VALUE: 21
PIF_VALUE: 21
PIF_VALUE: 1
PIF_VALUE: 18
PIF_VALUE: 1
PIF_VALUE: 0
PIF_VALUE: 17
PIF_VALUE: 2
PIF_VALUE: 1
PIF_VALUE: 3
PIF_VALUE: 0
PIF_VALUE: 2
PIF_VALUE: 3
PIF_VALUE: 17
PIF_VALUE: 19
PIF_VALUE: 1
PIF_VALUE: 20
PIF_VALUE: 2
PIF_VALUE: 0
PIF_VALUE: 1
PIF_VALUE: 17
PIF_VALUE: 1
PIF_VALUE: 21
PIF_VALUE: 2
PIF_VALUE: 21
PIF_VALUE: 1
PIF_VALUE: 1
PIF_VALUE: 24
PIF_VALUE: 18
PIF_VALUE: 18
PIF_VALUE: 1
PIF_VALUE: 21
PIF_VALUE: 15
PIF_VALUE: 1
PIF_VALUE: 18
PIF_VALUE: 20
PIF_VALUE: 16
PIF_VALUE: 19
PIF_VALUE: 1
PIF_VALUE: 22
PIF_VALUE: 2
PIF_VALUE: 17
PIF_VALUE: 20
PIF_VALUE: 16
PIF_VALUE: 21
PIF_VALUE: 17
PIF_VALUE: 20
PIF_VALUE: 19
PIF_VALUE: 17
PIF_VALUE: 18
PIF_VALUE: 1
PIF_VALUE: 19
PIF_VALUE: 15
PIF_VALUE: 15
PIF_VALUE: 19
PIF_VALUE: 21
PIF_VALUE: 3
PIF_VALUE: 17
PIF_VALUE: 20
PIF_VALUE: 22
PIF_VALUE: 14
PIF_VALUE: 20
PIF_VALUE: 1
PIF_VALUE: 1
PIF_VALUE: 17
PIF_VALUE: 20
PIF_VALUE: 19
PIF_VALUE: 16
PIF_VALUE: 19
PIF_VALUE: 17
PIF_VALUE: 1
PIF_VALUE: 1
PIF_VALUE: 21
PIF_VALUE: 18
PIF_VALUE: 29
PIF_VALUE: 2
PIF_VALUE: 1
PIF_VALUE: 3
PIF_VALUE: 1
PIF_VALUE: 18
PIF_VALUE: 19
PIF_VALUE: 2
PIF_VALUE: 18
PIF_VALUE: 18
PIF_VALUE: 1
PIF_VALUE: 2
PIF_VALUE: 2
PIF_VALUE: 21
PIF_VALUE: 2
PIF_VALUE: 0
PIF_VALUE: 1
PIF_VALUE: 17
PIF_VALUE: 0
PIF_VALUE: 3
PIF_VALUE: 16
PIF_VALUE: 19
PIF_VALUE: 21
PIF_VALUE: 0
PIF_VALUE: 21
PIF_VALUE: 15
PIF_VALUE: 16
PIF_VALUE: 0
PIF_VALUE: 18
PIF_VALUE: 20
PIF_VALUE: 1
PIF_VALUE: 17
PIF_VALUE: 2
PIF_VALUE: 19
PIF_VALUE: 1
PIF_VALUE: 21
PIF_VALUE: 21
PIF_VALUE: 3
PIF_VALUE: 17
PIF_VALUE: 17
PIF_VALUE: 2
PIF_VALUE: 3
PIF_VALUE: 18
PIF_VALUE: 21
PIF_VALUE: 20
PIF_VALUE: 17
PIF_VALUE: 1
PIF_VALUE: 21
PIF_VALUE: 1
PIF_VALUE: 17
PIF_VALUE: 21
PIF_VALUE: 1
PIF_VALUE: 19
PIF_VALUE: 3
PIF_VALUE: 17
PIF_VALUE: 3
PIF_VALUE: 20
PIF_VALUE: 1
PIF_VALUE: 21
PIF_VALUE: 22
PIF_VALUE: 20
PIF_VALUE: 21
PIF_VALUE: 2
PIF_VALUE: 18
PIF_VALUE: 14
PIF_VALUE: 24
PIF_VALUE: 18
PIF_VALUE: 1
PIF_VALUE: 18
PIF_VALUE: 17
PIF_VALUE: 21
PIF_VALUE: 18
PIF_VALUE: 17
PIF_VALUE: 2
PIF_VALUE: 23
PIF_VALUE: 3
PIF_VALUE: 1
PIF_VALUE: 35
PIF_VALUE: 18
PIF_VALUE: 1
PIF_VALUE: 3
PIF_VALUE: 18
PIF_VALUE: 19
PIF_VALUE: 19
PIF_VALUE: 21
PIF_VALUE: 3
PIF_VALUE: 19
PIF_VALUE: 19
PIF_VALUE: 17
PIF_VALUE: 11
PIF_VALUE: 19
PIF_VALUE: 20
PIF_VALUE: 23
PIF_VALUE: 0
PIF_VALUE: 2
PIF_VALUE: 19
PIF_VALUE: 2

## 2018-11-06 ASSESSMENT — PAIN SCALES - GENERAL
PAINLEVEL_OUTOF10: 3
PAINLEVEL_OUTOF10: 9
PAINLEVEL_OUTOF10: 3

## 2018-11-06 ASSESSMENT — PAIN DESCRIPTION - LOCATION: LOCATION: CHEST;STERNUM

## 2018-11-06 ASSESSMENT — PAIN DESCRIPTION - DESCRIPTORS: DESCRIPTORS: DISCOMFORT;NAGGING;SORE

## 2018-11-06 ASSESSMENT — PAIN DESCRIPTION - PAIN TYPE: TYPE: ACUTE PAIN;SURGICAL PAIN

## 2018-11-06 NOTE — BRIEF OP NOTE
Brief Postoperative Note    Leland Jauregui  YOB: 1950  [id number]    Pre-operative Diagnosis: MR/TR/Afib/CHF    Post-operative Diagnosis: Same    Procedure: Sternotomy/Mitral valve repair with 28mm Physio ring/Tricuspid valve repair with 28 mm Medtronic 3D band/Right and left cryo and bipolar MAZE procedure/PIOTR exclusion with 35 mm AtriClip/Complex sternal reconstruction with Yue weave bilaterally    Anesthesia: General    Surgeons/Assistants: Irma/Tj    Estimated Blood Loss: 7820    Complications: None    Specimens:   ID Type Source Tests Collected by Time Destination   A : LEFT ATRIAL APPENDAGE  Tissue Heart SURGICAL PATHOLOGY Marguerite Trimble MD 11/6/2018 1310        Implants:    Implant Name Type Inv.  Item Serial No.  Lot No. LRB No. Used   RING ANNULO CONTUR 3D TRICUSPID 28MM - [id number] Valve:Mitral/Mechanical/Tissue RING ANNULO CONTUR 3D TRICUSPID 28MM [id number] [employer] [place] [employer]  N/A 1   CLIP ATRICLIP FLEX HNDL 35MM Heart CLIP ATRICLIP FLEX HNDL 35MM  ATRICURE [id number] N/A 1   RING ANNULO HEART MITRAL PHYSIO 28MM - [id number] Heart RING ANNULO HEART MITRAL PHYSIO 28MM [id number] [employer] ALLIE   N/A 1         Drains:   Chest Tube 1 Posterior Mediastinal 32 [place] (Active)       Chest Tube 2 Anterior Mediastinal 32 [place] (Active)       Chest Tube 3 Pleural 19 [place] (Active)       Chest Tube 4 Pleural 19 [place] (Active)       Urethral Catheter Temperature probe 16 fr (Active)       Findings: see dictation    Marguerite Trimble MD  Date: 11/6/2018  Time: 3:09 PM       #[id number]

## 2018-11-07 ENCOUNTER — APPOINTMENT (OUTPATIENT)
Dept: GENERAL RADIOLOGY | Age: 68
DRG: 219 | End: 2018-11-07
Attending: THORACIC SURGERY (CARDIOTHORACIC VASCULAR SURGERY)
Payer: MEDICARE

## 2018-11-07 LAB
ANION GAP SERPL CALCULATED.3IONS-SCNC: 14 MMOL/L (ref 7–16)
B.E.: -5.9 MMOL/L (ref -3–3)
B.E.: -7.5 MMOL/L (ref -3–3)
BUN BLDV-MCNC: 18 MG/DL (ref 8–23)
CALCIUM SERPL-MCNC: 8 MG/DL (ref 8.6–10.2)
CHLORIDE BLD-SCNC: 112 MMOL/L (ref 98–107)
CO2: 19 MMOL/L (ref 22–29)
COHB: 0 % (ref 0–1.5)
COHB: 0 % (ref 0–1.5)
CREAT SERPL-MCNC: 1 MG/DL (ref 0.5–1)
CRITICAL: ABNORMAL
CRITICAL: ABNORMAL
DATE ANALYZED: ABNORMAL
DATE ANALYZED: ABNORMAL
DATE OF COLLECTION: ABNORMAL
DATE OF COLLECTION: ABNORMAL
GFR AFRICAN AMERICAN: >60
GFR NON-AFRICAN AMERICAN: 55 ML/MIN/1.73
GLUCOSE BLD-MCNC: 146 MG/DL (ref 74–99)
HCO3: 18.3 MMOL/L (ref 22–26)
HCO3: 19.3 MMOL/L (ref 22–26)
HCT VFR BLD CALC: 30.6 % (ref 34–48)
HEMOGLOBIN: 9.5 G/DL (ref 11.5–15.5)
HHB: 6.6 % (ref 0–5)
HHB: 7.3 % (ref 0–5)
LAB: ABNORMAL
MAGNESIUM: 2.6 MG/DL (ref 1.6–2.6)
MCH RBC QN AUTO: 31.1 PG (ref 26–35)
MCHC RBC AUTO-ENTMCNC: 31 % (ref 32–34.5)
MCV RBC AUTO: 100.3 FL (ref 80–99.9)
METER GLUCOSE: 126 MG/DL (ref 74–99)
METER GLUCOSE: 130 MG/DL (ref 74–99)
METER GLUCOSE: 135 MG/DL (ref 74–99)
METER GLUCOSE: 140 MG/DL (ref 74–99)
METER GLUCOSE: 143 MG/DL (ref 74–99)
METER GLUCOSE: 149 MG/DL (ref 74–99)
METER GLUCOSE: 168 MG/DL (ref 74–99)
METHB: 0 % (ref 0–1.5)
METHB: 0.1 % (ref 0–1.5)
MODE: ABNORMAL
MODE: ABNORMAL
O2 SATURATION: 92.7 % (ref 92–98.5)
O2 SATURATION: 93.4 % (ref 92–98.5)
O2HB: 92.7 % (ref 94–97)
O2HB: 93.3 % (ref 94–97)
OPERATOR ID: 1874
OPERATOR ID: 1874
PATIENT TEMP: 37 C
PATIENT TEMP: 37 C
PCO2: 36.7 MMHG (ref 35–45)
PCO2: 38.4 MMHG (ref 35–45)
PDW BLD-RTO: 16 FL (ref 11.5–15)
PH BLOOD GAS: 7.3 (ref 7.35–7.45)
PH BLOOD GAS: 7.34 (ref 7.35–7.45)
PLATELET # BLD: 251 E9/L (ref 130–450)
PMV BLD AUTO: 9.9 FL (ref 7–12)
PO2: 113.6 MMHG (ref 60–100)
PO2: 134.3 MMHG (ref 60–100)
POTASSIUM REFLEX MAGNESIUM: 5 MMOL/L (ref 3.5–5)
POTASSIUM SERPL-SCNC: 5 MMOL/L (ref 3.5–5)
RBC # BLD: 3.05 E12/L (ref 3.5–5.5)
SODIUM BLD-SCNC: 145 MMOL/L (ref 132–146)
SOURCE, BLOOD GAS: ABNORMAL
SOURCE, BLOOD GAS: ABNORMAL
THB: 9 G/DL (ref 11.5–16.5)
THB: 9.5 G/DL (ref 11.5–16.5)
TIME ANALYZED: 3
TIME ANALYZED: 423
WBC # BLD: 33.2 E9/L (ref 4.5–11.5)

## 2018-11-07 PROCEDURE — 2500000003 HC RX 250 WO HCPCS: Performed by: THORACIC SURGERY (CARDIOTHORACIC VASCULAR SURGERY)

## 2018-11-07 PROCEDURE — 2580000003 HC RX 258: Performed by: THORACIC SURGERY (CARDIOTHORACIC VASCULAR SURGERY)

## 2018-11-07 PROCEDURE — 83735 ASSAY OF MAGNESIUM: CPT

## 2018-11-07 PROCEDURE — 6370000000 HC RX 637 (ALT 250 FOR IP): Performed by: NURSE PRACTITIONER

## 2018-11-07 PROCEDURE — 99024 POSTOP FOLLOW-UP VISIT: CPT | Performed by: NURSE PRACTITIONER

## 2018-11-07 PROCEDURE — 82805 BLOOD GASES W/O2 SATURATION: CPT

## 2018-11-07 PROCEDURE — 6360000002 HC RX W HCPCS: Performed by: THORACIC SURGERY (CARDIOTHORACIC VASCULAR SURGERY)

## 2018-11-07 PROCEDURE — 2140000000 HC CCU INTERMEDIATE R&B

## 2018-11-07 PROCEDURE — 99232 SBSQ HOSP IP/OBS MODERATE 35: CPT | Performed by: INTERNAL MEDICINE

## 2018-11-07 PROCEDURE — P9046 ALBUMIN (HUMAN), 25%, 20 ML: HCPCS | Performed by: THORACIC SURGERY (CARDIOTHORACIC VASCULAR SURGERY)

## 2018-11-07 PROCEDURE — 2700000000 HC OXYGEN THERAPY PER DAY

## 2018-11-07 PROCEDURE — 80048 BASIC METABOLIC PNL TOTAL CA: CPT

## 2018-11-07 PROCEDURE — 71045 X-RAY EXAM CHEST 1 VIEW: CPT

## 2018-11-07 PROCEDURE — 84132 ASSAY OF SERUM POTASSIUM: CPT

## 2018-11-07 PROCEDURE — 36415 COLL VENOUS BLD VENIPUNCTURE: CPT

## 2018-11-07 PROCEDURE — 85027 COMPLETE CBC AUTOMATED: CPT

## 2018-11-07 PROCEDURE — 6360000002 HC RX W HCPCS: Performed by: NURSE PRACTITIONER

## 2018-11-07 PROCEDURE — 82962 GLUCOSE BLOOD TEST: CPT

## 2018-11-07 PROCEDURE — C9113 INJ PANTOPRAZOLE SODIUM, VIA: HCPCS | Performed by: THORACIC SURGERY (CARDIOTHORACIC VASCULAR SURGERY)

## 2018-11-07 PROCEDURE — 6370000000 HC RX 637 (ALT 250 FOR IP): Performed by: THORACIC SURGERY (CARDIOTHORACIC VASCULAR SURGERY)

## 2018-11-07 PROCEDURE — P9045 ALBUMIN (HUMAN), 5%, 250 ML: HCPCS | Performed by: NURSE PRACTITIONER

## 2018-11-07 RX ORDER — ACETAMINOPHEN 325 MG/1
650 TABLET ORAL EVERY 4 HOURS PRN
Status: DISCONTINUED | OUTPATIENT
Start: 2018-11-07 | End: 2018-11-12 | Stop reason: HOSPADM

## 2018-11-07 RX ORDER — AMIODARONE HYDROCHLORIDE 200 MG/1
400 TABLET ORAL 2 TIMES DAILY
Status: DISCONTINUED | OUTPATIENT
Start: 2018-11-07 | End: 2018-11-12 | Stop reason: HOSPADM

## 2018-11-07 RX ORDER — BISACODYL 10 MG
10 SUPPOSITORY, RECTAL RECTAL DAILY PRN
Status: DISCONTINUED | OUTPATIENT
Start: 2018-11-07 | End: 2018-11-12 | Stop reason: HOSPADM

## 2018-11-07 RX ORDER — PANTOPRAZOLE SODIUM 40 MG/1
40 TABLET, DELAYED RELEASE ORAL DAILY
Status: DISCONTINUED | OUTPATIENT
Start: 2018-11-08 | End: 2018-11-12 | Stop reason: HOSPADM

## 2018-11-07 RX ORDER — ASPIRIN 81 MG/1
81 TABLET ORAL DAILY
Status: DISCONTINUED | OUTPATIENT
Start: 2018-11-08 | End: 2018-11-12 | Stop reason: HOSPADM

## 2018-11-07 RX ORDER — ASCORBIC ACID 500 MG
500 TABLET ORAL 2 TIMES DAILY
Status: DISCONTINUED | OUTPATIENT
Start: 2018-11-07 | End: 2018-11-12 | Stop reason: HOSPADM

## 2018-11-07 RX ORDER — FERROUS SULFATE 325(65) MG
325 TABLET ORAL 2 TIMES DAILY WITH MEALS
Status: DISCONTINUED | OUTPATIENT
Start: 2018-11-07 | End: 2018-11-12 | Stop reason: HOSPADM

## 2018-11-07 RX ORDER — ALBUMIN, HUMAN INJ 5% 5 %
25 SOLUTION INTRAVENOUS ONCE
Status: COMPLETED | OUTPATIENT
Start: 2018-11-07 | End: 2018-11-07

## 2018-11-07 RX ORDER — HYDROCODONE BITARTRATE AND ACETAMINOPHEN 5; 325 MG/1; MG/1
2 TABLET ORAL EVERY 4 HOURS PRN
Status: DISCONTINUED | OUTPATIENT
Start: 2018-11-07 | End: 2018-11-12 | Stop reason: HOSPADM

## 2018-11-07 RX ORDER — DOCUSATE SODIUM 100 MG/1
100 CAPSULE, LIQUID FILLED ORAL 2 TIMES DAILY
Status: DISCONTINUED | OUTPATIENT
Start: 2018-11-07 | End: 2018-11-12 | Stop reason: HOSPADM

## 2018-11-07 RX ORDER — HYDROCODONE BITARTRATE AND ACETAMINOPHEN 5; 325 MG/1; MG/1
1 TABLET ORAL EVERY 4 HOURS PRN
Status: DISCONTINUED | OUTPATIENT
Start: 2018-11-07 | End: 2018-11-12 | Stop reason: HOSPADM

## 2018-11-07 RX ORDER — POTASSIUM CHLORIDE 20 MEQ/1
20 TABLET, EXTENDED RELEASE ORAL PRN
Status: DISCONTINUED | OUTPATIENT
Start: 2018-11-07 | End: 2018-11-12 | Stop reason: HOSPADM

## 2018-11-07 RX ORDER — ALBUMIN (HUMAN) 12.5 G/50ML
25 SOLUTION INTRAVENOUS ONCE
Status: COMPLETED | OUTPATIENT
Start: 2018-11-07 | End: 2018-11-07

## 2018-11-07 RX ORDER — FOLIC ACID 1 MG/1
1 TABLET ORAL DAILY
Status: DISCONTINUED | OUTPATIENT
Start: 2018-11-07 | End: 2018-11-12 | Stop reason: HOSPADM

## 2018-11-07 RX ADMIN — OXYCODONE AND ACETAMINOPHEN 1 TABLET: 5; 325 TABLET ORAL at 14:54

## 2018-11-07 RX ADMIN — MUPIROCIN: 20 OINTMENT TOPICAL at 21:36

## 2018-11-07 RX ADMIN — AMIODARONE HYDROCHLORIDE 400 MG: 200 TABLET ORAL at 08:19

## 2018-11-07 RX ADMIN — AMIODARONE HYDROCHLORIDE 400 MG: 200 TABLET ORAL at 21:35

## 2018-11-07 RX ADMIN — MAGNESIUM GLUCONATE 500 MG ORAL TABLET 400 MG: 500 TABLET ORAL at 08:21

## 2018-11-07 RX ADMIN — ALBUMIN (HUMAN) 25 G: 12.5 INJECTION, SOLUTION INTRAVENOUS at 09:45

## 2018-11-07 RX ADMIN — HYDROCODONE BITARTRATE AND ACETAMINOPHEN 1 TABLET: 5; 325 TABLET ORAL at 20:00

## 2018-11-07 RX ADMIN — VENLAFAXINE HYDROCHLORIDE 75 MG: 75 CAPSULE, EXTENDED RELEASE ORAL at 08:21

## 2018-11-07 RX ADMIN — FENTANYL CITRATE 25 MCG: 50 INJECTION, SOLUTION INTRAMUSCULAR; INTRAVENOUS at 05:10

## 2018-11-07 RX ADMIN — SODIUM CHLORIDE 30 ML/HR: 9 INJECTION, SOLUTION INTRAVENOUS at 05:40

## 2018-11-07 RX ADMIN — FERROUS SULFATE TAB 325 MG (65 MG ELEMENTAL FE) 325 MG: 325 (65 FE) TAB at 15:51

## 2018-11-07 RX ADMIN — CEFAZOLIN SODIUM 2 G: 10 INJECTION, POWDER, FOR SOLUTION INTRAVENOUS at 23:56

## 2018-11-07 RX ADMIN — CEFAZOLIN SODIUM 2 G: 10 INJECTION, POWDER, FOR SOLUTION INTRAVENOUS at 15:51

## 2018-11-07 RX ADMIN — Medication 10 MCG/MIN: at 00:12

## 2018-11-07 RX ADMIN — ALBUMIN (HUMAN) 25 G: 0.25 INJECTION, SOLUTION INTRAVENOUS at 14:47

## 2018-11-07 RX ADMIN — MAGNESIUM HYDROXIDE 30 ML: 400 SUSPENSION ORAL at 15:51

## 2018-11-07 RX ADMIN — ASPIRIN 81 MG: 81 TABLET ORAL at 08:21

## 2018-11-07 RX ADMIN — DOCUSATE SODIUM 100 MG: 100 CAPSULE, LIQUID FILLED ORAL at 21:36

## 2018-11-07 RX ADMIN — FLUOXETINE HYDROCHLORIDE 40 MG: 20 CAPSULE ORAL at 08:21

## 2018-11-07 RX ADMIN — ATORVASTATIN CALCIUM 10 MG: 10 TABLET, FILM COATED ORAL at 08:21

## 2018-11-07 RX ADMIN — ONDANSETRON 4 MG: 2 INJECTION INTRAMUSCULAR; INTRAVENOUS at 00:02

## 2018-11-07 RX ADMIN — Medication 10 ML: at 08:21

## 2018-11-07 RX ADMIN — Medication 10 ML: at 15:51

## 2018-11-07 RX ADMIN — Medication 10 ML: at 21:36

## 2018-11-07 RX ADMIN — INSULIN LISPRO 1 UNITS: 100 INJECTION, SOLUTION INTRAVENOUS; SUBCUTANEOUS at 16:15

## 2018-11-07 RX ADMIN — CEFAZOLIN SODIUM 2 G: 10 INJECTION, POWDER, FOR SOLUTION INTRAVENOUS at 07:43

## 2018-11-07 RX ADMIN — ALBUMIN (HUMAN) 25 G: 12.5 INJECTION, SOLUTION INTRAVENOUS at 07:43

## 2018-11-07 RX ADMIN — MUPIROCIN: 20 OINTMENT TOPICAL at 08:19

## 2018-11-07 RX ADMIN — INSULIN LISPRO 3 UNITS: 100 INJECTION, SOLUTION INTRAVENOUS; SUBCUTANEOUS at 00:06

## 2018-11-07 RX ADMIN — AMIODARONE HYDROCHLORIDE 0.5 MG/MIN: 50 INJECTION, SOLUTION INTRAVENOUS at 01:12

## 2018-11-07 RX ADMIN — INSULIN LISPRO 3 UNITS: 100 INJECTION, SOLUTION INTRAVENOUS; SUBCUTANEOUS at 04:13

## 2018-11-07 RX ADMIN — Medication 1 MG: at 16:15

## 2018-11-07 RX ADMIN — Medication 500 MG: at 21:35

## 2018-11-07 RX ADMIN — CALCIUM GLUCONATE 1 G: 98 INJECTION, SOLUTION INTRAVENOUS at 05:37

## 2018-11-07 RX ADMIN — Medication 10 ML: at 08:22

## 2018-11-07 RX ADMIN — FENTANYL CITRATE 25 MCG: 50 INJECTION, SOLUTION INTRAMUSCULAR; INTRAVENOUS at 02:41

## 2018-11-07 RX ADMIN — INSULIN LISPRO 3 UNITS: 100 INJECTION, SOLUTION INTRAVENOUS; SUBCUTANEOUS at 10:50

## 2018-11-07 RX ADMIN — PANTOPRAZOLE SODIUM 40 MG: 40 INJECTION, POWDER, FOR SOLUTION INTRAVENOUS at 08:21

## 2018-11-07 RX ADMIN — Medication 10 ML: at 06:16

## 2018-11-07 RX ADMIN — FENTANYL CITRATE 25 MCG: 50 INJECTION, SOLUTION INTRAMUSCULAR; INTRAVENOUS at 00:04

## 2018-11-07 RX ADMIN — OXYCODONE AND ACETAMINOPHEN 1 TABLET: 5; 325 TABLET ORAL at 08:19

## 2018-11-07 ASSESSMENT — PAIN SCALES - GENERAL
PAINLEVEL_OUTOF10: 9
PAINLEVEL_OUTOF10: 8
PAINLEVEL_OUTOF10: 9
PAINLEVEL_OUTOF10: 8
PAINLEVEL_OUTOF10: 9
PAINLEVEL_OUTOF10: 9
PAINLEVEL_OUTOF10: 8
PAINLEVEL_OUTOF10: 3
PAINLEVEL_OUTOF10: 4

## 2018-11-07 ASSESSMENT — PAIN DESCRIPTION - PAIN TYPE
TYPE: SURGICAL PAIN
TYPE: ACUTE PAIN;SURGICAL PAIN
TYPE: SURGICAL PAIN

## 2018-11-07 ASSESSMENT — PAIN DESCRIPTION - LOCATION
LOCATION: STERNUM
LOCATION: CHEST;STERNUM
LOCATION: CHEST

## 2018-11-07 ASSESSMENT — PAIN DESCRIPTION - FREQUENCY
FREQUENCY: INTERMITTENT
FREQUENCY: INTERMITTENT

## 2018-11-07 ASSESSMENT — PAIN DESCRIPTION - ORIENTATION: ORIENTATION: MID

## 2018-11-07 ASSESSMENT — PAIN DESCRIPTION - ONSET
ONSET: GRADUAL
ONSET: GRADUAL

## 2018-11-07 ASSESSMENT — PAIN DESCRIPTION - DESCRIPTORS
DESCRIPTORS: ACHING;CONSTANT;DISCOMFORT;NAGGING
DESCRIPTORS: ACHING;DISCOMFORT;NAGGING
DESCRIPTORS: ACHING;CONSTANT;DISCOMFORT
DESCRIPTORS: ACHING
DESCRIPTORS: ACHING;CONSTANT;DISCOMFORT

## 2018-11-07 ASSESSMENT — PAIN DESCRIPTION - PROGRESSION: CLINICAL_PROGRESSION: GRADUALLY WORSENING

## 2018-11-08 ENCOUNTER — APPOINTMENT (OUTPATIENT)
Dept: GENERAL RADIOLOGY | Age: 68
DRG: 219 | End: 2018-11-08
Attending: THORACIC SURGERY (CARDIOTHORACIC VASCULAR SURGERY)
Payer: MEDICARE

## 2018-11-08 LAB
ANION GAP SERPL CALCULATED.3IONS-SCNC: 12 MMOL/L (ref 7–16)
BUN BLDV-MCNC: 27 MG/DL (ref 8–23)
CALCIUM SERPL-MCNC: 8.2 MG/DL (ref 8.6–10.2)
CHLORIDE BLD-SCNC: 104 MMOL/L (ref 98–107)
CO2: 21 MMOL/L (ref 22–29)
CREAT SERPL-MCNC: 1.2 MG/DL (ref 0.5–1)
GFR AFRICAN AMERICAN: 54
GFR NON-AFRICAN AMERICAN: 45 ML/MIN/1.73
GLUCOSE BLD-MCNC: 119 MG/DL (ref 74–99)
HCT VFR BLD CALC: 26 % (ref 34–48)
HEMOGLOBIN: 7.8 G/DL (ref 11.5–15.5)
MCH RBC QN AUTO: 31.2 PG (ref 26–35)
MCHC RBC AUTO-ENTMCNC: 30 % (ref 32–34.5)
MCV RBC AUTO: 104 FL (ref 80–99.9)
METER GLUCOSE: 117 MG/DL (ref 74–99)
METER GLUCOSE: 121 MG/DL (ref 74–99)
METER GLUCOSE: 132 MG/DL (ref 74–99)
METER GLUCOSE: 140 MG/DL (ref 74–99)
PDW BLD-RTO: 15.8 FL (ref 11.5–15)
PLATELET # BLD: 180 E9/L (ref 130–450)
PMV BLD AUTO: 10.5 FL (ref 7–12)
POTASSIUM REFLEX MAGNESIUM: 5.4 MMOL/L (ref 3.5–5)
RBC # BLD: 2.5 E12/L (ref 3.5–5.5)
SODIUM BLD-SCNC: 137 MMOL/L (ref 132–146)
WBC # BLD: 24.7 E9/L (ref 4.5–11.5)

## 2018-11-08 PROCEDURE — 36430 TRANSFUSION BLD/BLD COMPNT: CPT

## 2018-11-08 PROCEDURE — 6370000000 HC RX 637 (ALT 250 FOR IP): Performed by: NURSE PRACTITIONER

## 2018-11-08 PROCEDURE — G8978 MOBILITY CURRENT STATUS: HCPCS

## 2018-11-08 PROCEDURE — 82962 GLUCOSE BLOOD TEST: CPT

## 2018-11-08 PROCEDURE — 97530 THERAPEUTIC ACTIVITIES: CPT

## 2018-11-08 PROCEDURE — 6360000002 HC RX W HCPCS: Performed by: PHYSICIAN ASSISTANT

## 2018-11-08 PROCEDURE — 71045 X-RAY EXAM CHEST 1 VIEW: CPT

## 2018-11-08 PROCEDURE — 97162 PT EVAL MOD COMPLEX 30 MIN: CPT

## 2018-11-08 PROCEDURE — 6370000000 HC RX 637 (ALT 250 FOR IP): Performed by: THORACIC SURGERY (CARDIOTHORACIC VASCULAR SURGERY)

## 2018-11-08 PROCEDURE — 85027 COMPLETE CBC AUTOMATED: CPT

## 2018-11-08 PROCEDURE — 2140000000 HC CCU INTERMEDIATE R&B

## 2018-11-08 PROCEDURE — P9016 RBC LEUKOCYTES REDUCED: HCPCS

## 2018-11-08 PROCEDURE — 80048 BASIC METABOLIC PNL TOTAL CA: CPT

## 2018-11-08 PROCEDURE — G8979 MOBILITY GOAL STATUS: HCPCS

## 2018-11-08 PROCEDURE — 36415 COLL VENOUS BLD VENIPUNCTURE: CPT

## 2018-11-08 PROCEDURE — 99232 SBSQ HOSP IP/OBS MODERATE 35: CPT | Performed by: INTERNAL MEDICINE

## 2018-11-08 PROCEDURE — 2580000003 HC RX 258: Performed by: THORACIC SURGERY (CARDIOTHORACIC VASCULAR SURGERY)

## 2018-11-08 RX ORDER — FUROSEMIDE 10 MG/ML
20 INJECTION INTRAMUSCULAR; INTRAVENOUS 2 TIMES DAILY
Status: COMPLETED | OUTPATIENT
Start: 2018-11-08 | End: 2018-11-08

## 2018-11-08 RX ORDER — ONDANSETRON 2 MG/ML
4 INJECTION INTRAMUSCULAR; INTRAVENOUS EVERY 8 HOURS PRN
Status: DISCONTINUED | OUTPATIENT
Start: 2018-11-08 | End: 2018-11-12 | Stop reason: HOSPADM

## 2018-11-08 RX ADMIN — Medication 10 ML: at 20:13

## 2018-11-08 RX ADMIN — ASPIRIN 81 MG: 81 TABLET ORAL at 09:36

## 2018-11-08 RX ADMIN — FERROUS SULFATE TAB 325 MG (65 MG ELEMENTAL FE) 325 MG: 325 (65 FE) TAB at 09:36

## 2018-11-08 RX ADMIN — FUROSEMIDE 20 MG: 10 INJECTION, SOLUTION INTRAMUSCULAR; INTRAVENOUS at 17:23

## 2018-11-08 RX ADMIN — Medication 500 MG: at 20:13

## 2018-11-08 RX ADMIN — MUPIROCIN: 20 OINTMENT TOPICAL at 20:13

## 2018-11-08 RX ADMIN — ONDANSETRON 4 MG: 2 INJECTION INTRAMUSCULAR; INTRAVENOUS at 09:36

## 2018-11-08 RX ADMIN — Medication 10 ML: at 17:24

## 2018-11-08 RX ADMIN — Medication 500 MG: at 09:36

## 2018-11-08 RX ADMIN — HYDROCODONE BITARTRATE AND ACETAMINOPHEN 2 TABLET: 5; 325 TABLET ORAL at 14:15

## 2018-11-08 RX ADMIN — Medication 1 MG: at 09:36

## 2018-11-08 RX ADMIN — Medication 10 ML: at 09:36

## 2018-11-08 RX ADMIN — AMIODARONE HYDROCHLORIDE 400 MG: 200 TABLET ORAL at 09:36

## 2018-11-08 RX ADMIN — HYDROCODONE BITARTRATE AND ACETAMINOPHEN 2 TABLET: 5; 325 TABLET ORAL at 04:06

## 2018-11-08 RX ADMIN — AMIODARONE HYDROCHLORIDE 400 MG: 200 TABLET ORAL at 20:13

## 2018-11-08 RX ADMIN — DOCUSATE SODIUM 100 MG: 100 CAPSULE, LIQUID FILLED ORAL at 20:14

## 2018-11-08 RX ADMIN — HYDROCODONE BITARTRATE AND ACETAMINOPHEN 2 TABLET: 5; 325 TABLET ORAL at 20:13

## 2018-11-08 RX ADMIN — FLUOXETINE HYDROCHLORIDE 40 MG: 20 CAPSULE ORAL at 09:36

## 2018-11-08 RX ADMIN — MUPIROCIN: 20 OINTMENT TOPICAL at 09:36

## 2018-11-08 RX ADMIN — MAGNESIUM GLUCONATE 500 MG ORAL TABLET 400 MG: 500 TABLET ORAL at 09:36

## 2018-11-08 RX ADMIN — VENLAFAXINE HYDROCHLORIDE 75 MG: 75 CAPSULE, EXTENDED RELEASE ORAL at 09:36

## 2018-11-08 RX ADMIN — ATORVASTATIN CALCIUM 10 MG: 10 TABLET, FILM COATED ORAL at 09:37

## 2018-11-08 RX ADMIN — MAGNESIUM HYDROXIDE 30 ML: 400 SUSPENSION ORAL at 09:36

## 2018-11-08 RX ADMIN — PANTOPRAZOLE SODIUM 40 MG: 40 TABLET, DELAYED RELEASE ORAL at 09:36

## 2018-11-08 RX ADMIN — HYDROCODONE BITARTRATE AND ACETAMINOPHEN 2 TABLET: 5; 325 TABLET ORAL at 09:03

## 2018-11-08 RX ADMIN — FUROSEMIDE 20 MG: 10 INJECTION, SOLUTION INTRAMUSCULAR; INTRAVENOUS at 09:35

## 2018-11-08 RX ADMIN — FERROUS SULFATE TAB 325 MG (65 MG ELEMENTAL FE) 325 MG: 325 (65 FE) TAB at 16:34

## 2018-11-08 RX ADMIN — DOCUSATE SODIUM 100 MG: 100 CAPSULE, LIQUID FILLED ORAL at 09:36

## 2018-11-08 ASSESSMENT — PAIN DESCRIPTION - LOCATION
LOCATION: CHEST;STERNUM
LOCATION: STERNUM
LOCATION: STERNUM

## 2018-11-08 ASSESSMENT — PAIN SCALES - GENERAL
PAINLEVEL_OUTOF10: 7
PAINLEVEL_OUTOF10: 5
PAINLEVEL_OUTOF10: 7
PAINLEVEL_OUTOF10: 10
PAINLEVEL_OUTOF10: 2
PAINLEVEL_OUTOF10: 7
PAINLEVEL_OUTOF10: 3
PAINLEVEL_OUTOF10: 2

## 2018-11-08 ASSESSMENT — PAIN DESCRIPTION - DESCRIPTORS
DESCRIPTORS: ACHING;CONSTANT;DISCOMFORT
DESCRIPTORS: SORE;ACHING;DISCOMFORT
DESCRIPTORS: ACHING;CONSTANT;DISCOMFORT

## 2018-11-08 ASSESSMENT — PAIN DESCRIPTION - ONSET
ONSET: ON-GOING
ONSET: AWAKENED FROM SLEEP

## 2018-11-08 ASSESSMENT — PAIN DESCRIPTION - ORIENTATION
ORIENTATION: MID

## 2018-11-08 ASSESSMENT — PAIN DESCRIPTION - FREQUENCY
FREQUENCY: INTERMITTENT
FREQUENCY: INTERMITTENT

## 2018-11-08 ASSESSMENT — PAIN DESCRIPTION - PAIN TYPE
TYPE: SURGICAL PAIN

## 2018-11-08 ASSESSMENT — PAIN DESCRIPTION - PROGRESSION: CLINICAL_PROGRESSION: GRADUALLY WORSENING

## 2018-11-08 NOTE — PLAN OF CARE
Problem: Nutrition  Goal: Optimal nutrition therapy  Outcome: Ongoing  Nutrition Problem: Increased nutrient needs  Intervention: Food and/or Nutrient Delivery: Continue current diet, Continue current ONS  Nutritional Goals: Consume > 75% of meals/ONS.

## 2018-11-08 NOTE — PLAN OF CARE
Problem: Falls - Risk of:  Goal: Will remain free from falls  Will remain free from falls   Outcome: Met This Shift    Goal: Absence of physical injury  Absence of physical injury   Outcome: Met This Shift      Problem: Pain:  Goal: Patient's pain/discomfort is manageable  Patient's pain/discomfort is manageable   Outcome: Met This Shift    Goal: Control of acute pain  Control of acute pain   Outcome: Met This Shift    Goal: Control of chronic pain  Control of chronic pain   Outcome: Met This Shift

## 2018-11-09 LAB
ANION GAP SERPL CALCULATED.3IONS-SCNC: 12 MMOL/L (ref 7–16)
ANION GAP SERPL CALCULATED.3IONS-SCNC: 13 MMOL/L (ref 7–16)
BUN BLDV-MCNC: 28 MG/DL (ref 8–23)
BUN BLDV-MCNC: 29 MG/DL (ref 8–23)
CALCIUM SERPL-MCNC: 8 MG/DL (ref 8.6–10.2)
CALCIUM SERPL-MCNC: 8.2 MG/DL (ref 8.6–10.2)
CHLORIDE BLD-SCNC: 93 MMOL/L (ref 98–107)
CHLORIDE BLD-SCNC: 97 MMOL/L (ref 98–107)
CO2: 20 MMOL/L (ref 22–29)
CO2: 24 MMOL/L (ref 22–29)
CREAT SERPL-MCNC: 1 MG/DL (ref 0.5–1)
CREAT SERPL-MCNC: 1 MG/DL (ref 0.5–1)
GFR AFRICAN AMERICAN: >60
GFR AFRICAN AMERICAN: >60
GFR NON-AFRICAN AMERICAN: 55 ML/MIN/1.73
GFR NON-AFRICAN AMERICAN: 55 ML/MIN/1.73
GLUCOSE BLD-MCNC: 105 MG/DL (ref 74–99)
GLUCOSE BLD-MCNC: 107 MG/DL (ref 74–99)
HCT VFR BLD CALC: 27.7 % (ref 34–48)
HEMOGLOBIN: 8.7 G/DL (ref 11.5–15.5)
MCH RBC QN AUTO: 31.3 PG (ref 26–35)
MCHC RBC AUTO-ENTMCNC: 31.4 % (ref 32–34.5)
MCV RBC AUTO: 99.6 FL (ref 80–99.9)
METER GLUCOSE: 107 MG/DL (ref 74–99)
METER GLUCOSE: 108 MG/DL (ref 74–99)
METER GLUCOSE: 112 MG/DL (ref 74–99)
PDW BLD-RTO: 15 FL (ref 11.5–15)
PLATELET # BLD: 171 E9/L (ref 130–450)
PMV BLD AUTO: 10 FL (ref 7–12)
POTASSIUM REFLEX MAGNESIUM: 5.5 MMOL/L (ref 3.5–5)
POTASSIUM SERPL-SCNC: 4.9 MMOL/L (ref 3.5–5)
RBC # BLD: 2.78 E12/L (ref 3.5–5.5)
SODIUM BLD-SCNC: 129 MMOL/L (ref 132–146)
SODIUM BLD-SCNC: 130 MMOL/L (ref 132–146)
WBC # BLD: 16.3 E9/L (ref 4.5–11.5)

## 2018-11-09 PROCEDURE — 6370000000 HC RX 637 (ALT 250 FOR IP): Performed by: NURSE PRACTITIONER

## 2018-11-09 PROCEDURE — 80048 BASIC METABOLIC PNL TOTAL CA: CPT

## 2018-11-09 PROCEDURE — 6370000000 HC RX 637 (ALT 250 FOR IP): Performed by: STUDENT IN AN ORGANIZED HEALTH CARE EDUCATION/TRAINING PROGRAM

## 2018-11-09 PROCEDURE — 97530 THERAPEUTIC ACTIVITIES: CPT

## 2018-11-09 PROCEDURE — 2580000003 HC RX 258: Performed by: THORACIC SURGERY (CARDIOTHORACIC VASCULAR SURGERY)

## 2018-11-09 PROCEDURE — 6360000002 HC RX W HCPCS: Performed by: PHYSICIAN ASSISTANT

## 2018-11-09 PROCEDURE — 93798 PHYS/QHP OP CAR RHAB W/ECG: CPT

## 2018-11-09 PROCEDURE — 2700000000 HC OXYGEN THERAPY PER DAY

## 2018-11-09 PROCEDURE — G8987 SELF CARE CURRENT STATUS: HCPCS

## 2018-11-09 PROCEDURE — 97535 SELF CARE MNGMENT TRAINING: CPT

## 2018-11-09 PROCEDURE — 97166 OT EVAL MOD COMPLEX 45 MIN: CPT

## 2018-11-09 PROCEDURE — 85027 COMPLETE CBC AUTOMATED: CPT

## 2018-11-09 PROCEDURE — 6370000000 HC RX 637 (ALT 250 FOR IP): Performed by: THORACIC SURGERY (CARDIOTHORACIC VASCULAR SURGERY)

## 2018-11-09 PROCEDURE — 36415 COLL VENOUS BLD VENIPUNCTURE: CPT

## 2018-11-09 PROCEDURE — G8988 SELF CARE GOAL STATUS: HCPCS

## 2018-11-09 PROCEDURE — 82962 GLUCOSE BLOOD TEST: CPT

## 2018-11-09 PROCEDURE — 99232 SBSQ HOSP IP/OBS MODERATE 35: CPT | Performed by: INTERNAL MEDICINE

## 2018-11-09 PROCEDURE — 2140000000 HC CCU INTERMEDIATE R&B

## 2018-11-09 RX ORDER — SENNA PLUS 8.6 MG/1
1 TABLET ORAL NIGHTLY
Status: DISCONTINUED | OUTPATIENT
Start: 2018-11-09 | End: 2018-11-12 | Stop reason: HOSPADM

## 2018-11-09 RX ORDER — FUROSEMIDE 10 MG/ML
40 INJECTION INTRAMUSCULAR; INTRAVENOUS ONCE
Status: COMPLETED | OUTPATIENT
Start: 2018-11-09 | End: 2018-11-09

## 2018-11-09 RX ORDER — POLYETHYLENE GLYCOL 3350 17 G/17G
17 POWDER, FOR SOLUTION ORAL 2 TIMES DAILY
Status: DISCONTINUED | OUTPATIENT
Start: 2018-11-09 | End: 2018-11-12 | Stop reason: HOSPADM

## 2018-11-09 RX ADMIN — MAGNESIUM HYDROXIDE 30 ML: 400 SUSPENSION ORAL at 09:29

## 2018-11-09 RX ADMIN — HYDROCODONE BITARTRATE AND ACETAMINOPHEN 2 TABLET: 5; 325 TABLET ORAL at 20:18

## 2018-11-09 RX ADMIN — PANTOPRAZOLE SODIUM 40 MG: 40 TABLET, DELAYED RELEASE ORAL at 09:28

## 2018-11-09 RX ADMIN — ATORVASTATIN CALCIUM 10 MG: 10 TABLET, FILM COATED ORAL at 09:28

## 2018-11-09 RX ADMIN — HYDROCODONE BITARTRATE AND ACETAMINOPHEN 2 TABLET: 5; 325 TABLET ORAL at 10:11

## 2018-11-09 RX ADMIN — HYDROCODONE BITARTRATE AND ACETAMINOPHEN 2 TABLET: 5; 325 TABLET ORAL at 05:01

## 2018-11-09 RX ADMIN — Medication 10 ML: at 11:32

## 2018-11-09 RX ADMIN — Medication 10 ML: at 20:17

## 2018-11-09 RX ADMIN — FLUOXETINE HYDROCHLORIDE 40 MG: 20 CAPSULE ORAL at 09:28

## 2018-11-09 RX ADMIN — Medication 10 ML: at 09:29

## 2018-11-09 RX ADMIN — SENNOSIDES 8.6 MG: 8.6 TABLET, FILM COATED ORAL at 20:17

## 2018-11-09 RX ADMIN — HYDROCODONE BITARTRATE AND ACETAMINOPHEN 2 TABLET: 5; 325 TABLET ORAL at 00:34

## 2018-11-09 RX ADMIN — FERROUS SULFATE TAB 325 MG (65 MG ELEMENTAL FE) 325 MG: 325 (65 FE) TAB at 09:28

## 2018-11-09 RX ADMIN — MUPIROCIN: 20 OINTMENT TOPICAL at 09:28

## 2018-11-09 RX ADMIN — AMIODARONE HYDROCHLORIDE 400 MG: 200 TABLET ORAL at 09:28

## 2018-11-09 RX ADMIN — Medication 500 MG: at 09:28

## 2018-11-09 RX ADMIN — Medication 500 MG: at 20:17

## 2018-11-09 RX ADMIN — FUROSEMIDE 40 MG: 10 INJECTION, SOLUTION INTRAMUSCULAR; INTRAVENOUS at 13:25

## 2018-11-09 RX ADMIN — VENLAFAXINE HYDROCHLORIDE 75 MG: 75 CAPSULE, EXTENDED RELEASE ORAL at 09:28

## 2018-11-09 RX ADMIN — DOCUSATE SODIUM 100 MG: 100 CAPSULE, LIQUID FILLED ORAL at 20:17

## 2018-11-09 RX ADMIN — FERROUS SULFATE TAB 325 MG (65 MG ELEMENTAL FE) 325 MG: 325 (65 FE) TAB at 17:14

## 2018-11-09 RX ADMIN — ASPIRIN 81 MG: 81 TABLET ORAL at 09:28

## 2018-11-09 RX ADMIN — AMIODARONE HYDROCHLORIDE 400 MG: 200 TABLET ORAL at 20:17

## 2018-11-09 RX ADMIN — POLYETHYLENE GLYCOL 3350 17 G: 17 POWDER, FOR SOLUTION ORAL at 23:10

## 2018-11-09 RX ADMIN — Medication 10 ML: at 13:25

## 2018-11-09 RX ADMIN — DOCUSATE SODIUM 100 MG: 100 CAPSULE, LIQUID FILLED ORAL at 09:28

## 2018-11-09 RX ADMIN — ONDANSETRON 4 MG: 2 INJECTION INTRAMUSCULAR; INTRAVENOUS at 11:32

## 2018-11-09 RX ADMIN — Medication 1 MG: at 09:28

## 2018-11-09 RX ADMIN — MAGNESIUM GLUCONATE 500 MG ORAL TABLET 400 MG: 500 TABLET ORAL at 09:28

## 2018-11-09 RX ADMIN — MUPIROCIN: 20 OINTMENT TOPICAL at 23:04

## 2018-11-09 ASSESSMENT — PAIN DESCRIPTION - LOCATION
LOCATION: CHEST;STERNUM
LOCATION: CHEST;STERNUM

## 2018-11-09 ASSESSMENT — PAIN DESCRIPTION - PAIN TYPE
TYPE: SURGICAL PAIN
TYPE: SURGICAL PAIN

## 2018-11-09 ASSESSMENT — PAIN SCALES - GENERAL
PAINLEVEL_OUTOF10: 7
PAINLEVEL_OUTOF10: 2
PAINLEVEL_OUTOF10: 7
PAINLEVEL_OUTOF10: 8
PAINLEVEL_OUTOF10: 7

## 2018-11-09 ASSESSMENT — PAIN DESCRIPTION - DESCRIPTORS
DESCRIPTORS: ACHING;DISCOMFORT;SORE
DESCRIPTORS: ACHING;DISCOMFORT;SORE

## 2018-11-09 NOTE — CARE COORDINATION
SOCIAL WORK AND DISCHARGE PLANNING:   Per pt choice, community home health care is setup. rn to Fax med rec and home going instructions to [phone]. rn to Make sure if discharged over weekend , sw to call them at [phone]. Alicia Calhoun  11/9/2018

## 2018-11-09 NOTE — CONSULTS
GENERAL SURGERY  CONSULT NOTE  11/9/2018    Physician Consulted: Dr. Flory Will  Reason for Consult: Ileus      HPI  Shreya Zaman is a 80 y.o. female who presents for evaluation of nausea s/p open heart 11/6. She states her nausea has improved and resolves with zofran. She denies any vomiting. She is passing flatus but has not had a BM. She is eating only a little of her food. Past Medical History:   Diagnosis Date    Anemia     Anxiety     Atrial fibrillation (HCC)     Bradycardia     Chest pain     Depression     Dyspnea     GERD (gastroesophageal reflux disease)     Lung nodule     SSS (sick sinus syndrome) (HCC)     Symptomatic bradycardia     Transaminitis        Past Surgical History:   Procedure Laterality Date    ABDOMEN SURGERY      BARIATRIC SURGERY      CARDIAC CATHETERIZATION  09/28/2018    [facility]-Dr. Melvin Lopez, AU 7-70    CARDIOVERSION  09/05/2018    Dr. Lauren Pompa      ENDOSCOPY, COLON, DIAGNOSTIC      PACEMAKER INSERTION  07/13/ 2018    [facility] - Dr.Katz Lv [facility]W OFFICE/OUTPT VISIT,PROCEDURE ONLY N/A 11/6/2018    MITRAL VALVE REPAIR VS. REPLACEMENT, TRICUSPID VALVE REPAIR, MAZE PROCEDURE, PRUDENCE WITH JANI performed by Arthur Whelan MD at [place] [id number] TRANSESOPHAGEAL ECHOCARDIOGRAM  09/05/2018    Dr. Hubert Nesbitt       Medications Prior to Admission:    Prior to Admission medications    Medication Sig Start Date End Date Taking? Authorizing Provider   losartan (COZAAR) 25 MG tablet Take 1 tablet by mouth nightly 10/4/18  Yes Ce Kan MD   furosemide (LASIX) 20 MG tablet Take 1 tablet by mouth every morning (before breakfast)  Patient taking differently: Take 20 mg by mouth every morning (before breakfast) Patient taking qod 10/4/18  Yes Ce Kan MD   LORazepam (ATIVAN) 1 MG tablet Take 1 mg by mouth every morning. .   Yes Historical Provider, MD   atorvastatin (LIPITOR) 10 MG tablet Take 10 mg by mouth every morning  6/18/18  Yes Historical Provider, MD   cyanocobalamin 1000 MCG/ML injection Inject 1,000 mcg into the muscle every 30 days  6/5/18  Yes Historical Provider, MD   FLUoxetine (PROZAC) 40 MG capsule Take 40 mg by mouth every morning  6/18/18  Yes Historical Provider, MD   venlafaxine (EFFEXOR XR) 75 MG extended release capsule Take 75 mg by mouth every morning  6/18/18  Yes Historical Provider, MD   omeprazole (PRILOSEC) 40 MG delayed release capsule Take 40 mg by mouth daily as needed  6/18/18  Yes Historical Provider, MD   rivaroxaban (XARELTO) 20 MG TABS tablet Take 20 mg by mouth Daily with supper     Historical Provider, MD       Allergies   Allergen Reactions    Iv Dye [Iodides] Swelling    Pcn [Penicillins] Swelling    Adhesive Tape Rash       No family history on file. Social History   Substance Use Topics    Smoking status: Former Smoker     Packs/day: 0.25     Years: 3.00     Quit date: 7/12/1983    Smokeless tobacco: Never Used    Alcohol use Yes      Comment: rare wine         Review of Systems   General ROS: fever (-), chills (-)  Respiratory ROS: SOB (-)  Cardiovascular ROS: CP (+)  Gastrointestinal ROS: Nausea (+), vomiting (-), diarrhea (-), constipation (-), melena (-), hematochezia (-)        PHYSICAL EXAM:    Vitals:    11/09/18 1150   BP: (!) 121/55   Pulse: 71   Resp: 16   Temp: 98.3 °F (36.8 °C)   SpO2: 93%       General Appearance:  Alert, awake, cooperative, lying in bed   Lungs:  Normal work of breathing   Heart:  Regular rate   Abdomen:  Soft, no tenderness, mildly distended       LABS:  CBC  Recent Labs      11/09/18   0532   WBC  16.3*   HGB  8.7*   HCT  27.7*   PLT  171     BMP  Recent Labs      11/09/18   0532   NA  130*   K  5.5*   CL  97*   CO2  20*   BUN  29*   CREATININE  1.0   CALCIUM  8.0*     Liver Function  No results for input(s): AMYLASE, LIPASE, BILITOT, BILIDIR, AST, ALT, ALKPHOS, PROT, LABALBU in the last 72 hours.   No results for input(s): LACTATE in

## 2018-11-10 LAB
ANION GAP SERPL CALCULATED.3IONS-SCNC: 13 MMOL/L (ref 7–16)
ANION GAP SERPL CALCULATED.3IONS-SCNC: 13 MMOL/L (ref 7–16)
BLOOD BANK DISPENSE STATUS: NORMAL
BLOOD BANK PRODUCT CODE: NORMAL
BPU ID: NORMAL
BUN BLDV-MCNC: 23 MG/DL (ref 8–23)
BUN BLDV-MCNC: 23 MG/DL (ref 8–23)
CALCIUM SERPL-MCNC: 8.3 MG/DL (ref 8.6–10.2)
CALCIUM SERPL-MCNC: 8.4 MG/DL (ref 8.6–10.2)
CHLORIDE BLD-SCNC: 93 MMOL/L (ref 98–107)
CHLORIDE BLD-SCNC: 95 MMOL/L (ref 98–107)
CO2: 23 MMOL/L (ref 22–29)
CO2: 23 MMOL/L (ref 22–29)
CREAT SERPL-MCNC: 0.9 MG/DL (ref 0.5–1)
CREAT SERPL-MCNC: 0.9 MG/DL (ref 0.5–1)
DESCRIPTION BLOOD BANK: NORMAL
GFR AFRICAN AMERICAN: >60
GFR AFRICAN AMERICAN: >60
GFR NON-AFRICAN AMERICAN: >60 ML/MIN/1.73
GFR NON-AFRICAN AMERICAN: >60 ML/MIN/1.73
GLUCOSE BLD-MCNC: 101 MG/DL (ref 74–99)
GLUCOSE BLD-MCNC: 106 MG/DL (ref 74–99)
POTASSIUM SERPL-SCNC: 4.8 MMOL/L (ref 3.5–5)
POTASSIUM SERPL-SCNC: 5.2 MMOL/L (ref 3.5–5)
SODIUM BLD-SCNC: 129 MMOL/L (ref 132–146)
SODIUM BLD-SCNC: 131 MMOL/L (ref 132–146)

## 2018-11-10 PROCEDURE — 2140000000 HC CCU INTERMEDIATE R&B

## 2018-11-10 PROCEDURE — 6370000000 HC RX 637 (ALT 250 FOR IP): Performed by: PHYSICIAN ASSISTANT

## 2018-11-10 PROCEDURE — 36415 COLL VENOUS BLD VENIPUNCTURE: CPT

## 2018-11-10 PROCEDURE — 2580000003 HC RX 258: Performed by: THORACIC SURGERY (CARDIOTHORACIC VASCULAR SURGERY)

## 2018-11-10 PROCEDURE — 6370000000 HC RX 637 (ALT 250 FOR IP): Performed by: STUDENT IN AN ORGANIZED HEALTH CARE EDUCATION/TRAINING PROGRAM

## 2018-11-10 PROCEDURE — 6360000002 HC RX W HCPCS: Performed by: PHYSICIAN ASSISTANT

## 2018-11-10 PROCEDURE — 6370000000 HC RX 637 (ALT 250 FOR IP): Performed by: THORACIC SURGERY (CARDIOTHORACIC VASCULAR SURGERY)

## 2018-11-10 PROCEDURE — 80048 BASIC METABOLIC PNL TOTAL CA: CPT

## 2018-11-10 PROCEDURE — 6370000000 HC RX 637 (ALT 250 FOR IP): Performed by: NURSE PRACTITIONER

## 2018-11-10 PROCEDURE — 93798 PHYS/QHP OP CAR RHAB W/ECG: CPT

## 2018-11-10 RX ADMIN — HYDROCODONE BITARTRATE AND ACETAMINOPHEN 1 TABLET: 5; 325 TABLET ORAL at 11:21

## 2018-11-10 RX ADMIN — Medication 500 MG: at 08:53

## 2018-11-10 RX ADMIN — APIXABAN 5 MG: 5 TABLET, FILM COATED ORAL at 11:19

## 2018-11-10 RX ADMIN — AMIODARONE HYDROCHLORIDE 400 MG: 200 TABLET ORAL at 20:46

## 2018-11-10 RX ADMIN — VENLAFAXINE HYDROCHLORIDE 75 MG: 75 CAPSULE, EXTENDED RELEASE ORAL at 08:53

## 2018-11-10 RX ADMIN — METOPROLOL TARTRATE 25 MG: 25 TABLET ORAL at 20:46

## 2018-11-10 RX ADMIN — PANTOPRAZOLE SODIUM 40 MG: 40 TABLET, DELAYED RELEASE ORAL at 08:53

## 2018-11-10 RX ADMIN — POLYETHYLENE GLYCOL 3350 17 G: 17 POWDER, FOR SOLUTION ORAL at 08:54

## 2018-11-10 RX ADMIN — FERROUS SULFATE TAB 325 MG (65 MG ELEMENTAL FE) 325 MG: 325 (65 FE) TAB at 08:53

## 2018-11-10 RX ADMIN — METOPROLOL TARTRATE 25 MG: 25 TABLET ORAL at 08:57

## 2018-11-10 RX ADMIN — MUPIROCIN: 20 OINTMENT TOPICAL at 08:53

## 2018-11-10 RX ADMIN — DOCUSATE SODIUM 100 MG: 100 CAPSULE, LIQUID FILLED ORAL at 20:46

## 2018-11-10 RX ADMIN — MUPIROCIN: 20 OINTMENT TOPICAL at 20:47

## 2018-11-10 RX ADMIN — ONDANSETRON 4 MG: 2 INJECTION INTRAMUSCULAR; INTRAVENOUS at 08:53

## 2018-11-10 RX ADMIN — AMIODARONE HYDROCHLORIDE 400 MG: 200 TABLET ORAL at 08:53

## 2018-11-10 RX ADMIN — ATORVASTATIN CALCIUM 10 MG: 10 TABLET, FILM COATED ORAL at 08:53

## 2018-11-10 RX ADMIN — HYDROCODONE BITARTRATE AND ACETAMINOPHEN 2 TABLET: 5; 325 TABLET ORAL at 20:05

## 2018-11-10 RX ADMIN — Medication 1 MG: at 08:53

## 2018-11-10 RX ADMIN — FLUOXETINE HYDROCHLORIDE 40 MG: 20 CAPSULE ORAL at 08:53

## 2018-11-10 RX ADMIN — POLYETHYLENE GLYCOL 3350 17 G: 17 POWDER, FOR SOLUTION ORAL at 20:46

## 2018-11-10 RX ADMIN — Medication 500 MG: at 20:46

## 2018-11-10 RX ADMIN — MAGNESIUM GLUCONATE 500 MG ORAL TABLET 400 MG: 500 TABLET ORAL at 08:53

## 2018-11-10 RX ADMIN — Medication 10 ML: at 08:53

## 2018-11-10 RX ADMIN — Medication 10 ML: at 20:46

## 2018-11-10 RX ADMIN — FERROUS SULFATE TAB 325 MG (65 MG ELEMENTAL FE) 325 MG: 325 (65 FE) TAB at 16:46

## 2018-11-10 RX ADMIN — APIXABAN 5 MG: 5 TABLET, FILM COATED ORAL at 20:46

## 2018-11-10 RX ADMIN — HYDROCODONE BITARTRATE AND ACETAMINOPHEN 1 TABLET: 5; 325 TABLET ORAL at 03:46

## 2018-11-10 RX ADMIN — ASPIRIN 81 MG: 81 TABLET ORAL at 08:53

## 2018-11-10 RX ADMIN — SENNOSIDES 8.6 MG: 8.6 TABLET, FILM COATED ORAL at 20:46

## 2018-11-10 RX ADMIN — DOCUSATE SODIUM 100 MG: 100 CAPSULE, LIQUID FILLED ORAL at 08:53

## 2018-11-10 ASSESSMENT — PAIN DESCRIPTION - ONSET: ONSET: ON-GOING

## 2018-11-10 ASSESSMENT — PAIN SCALES - GENERAL
PAINLEVEL_OUTOF10: 2
PAINLEVEL_OUTOF10: 4
PAINLEVEL_OUTOF10: 0
PAINLEVEL_OUTOF10: 3
PAINLEVEL_OUTOF10: 0
PAINLEVEL_OUTOF10: 10

## 2018-11-10 ASSESSMENT — PAIN DESCRIPTION - FREQUENCY: FREQUENCY: INTERMITTENT

## 2018-11-10 ASSESSMENT — PAIN DESCRIPTION - ORIENTATION: ORIENTATION: MID

## 2018-11-10 ASSESSMENT — PAIN DESCRIPTION - PAIN TYPE: TYPE: SURGICAL PAIN

## 2018-11-10 ASSESSMENT — PAIN DESCRIPTION - LOCATION: LOCATION: STERNUM

## 2018-11-10 NOTE — PLAN OF CARE
Problem: Falls - Risk of:  Goal: Will remain free from falls  Will remain free from falls   Outcome: Met This Shift      Problem: Pain - Acute:  Goal: Pain level will decrease  Pain level will decrease    Outcome: Met This Shift      Problem: Safety:  Goal: Free from accidental physical injury  Free from accidental physical injury   Outcome: Met This Shift      Problem: Pain:  Goal: Pain level will decrease  Pain level will decrease    Outcome: Met This Shift

## 2018-11-11 LAB
ANION GAP SERPL CALCULATED.3IONS-SCNC: 12 MMOL/L (ref 7–16)
BUN BLDV-MCNC: 21 MG/DL (ref 8–23)
CALCIUM SERPL-MCNC: 8.4 MG/DL (ref 8.6–10.2)
CHLORIDE BLD-SCNC: 97 MMOL/L (ref 98–107)
CO2: 24 MMOL/L (ref 22–29)
CORTISOL TOTAL: 17.66 MCG/DL (ref 2.68–18.4)
CREAT SERPL-MCNC: 0.8 MG/DL (ref 0.5–1)
CREATININE URINE: 38 MG/DL (ref 29–226)
GFR AFRICAN AMERICAN: >60
GFR NON-AFRICAN AMERICAN: >60 ML/MIN/1.73
GLUCOSE BLD-MCNC: 92 MG/DL (ref 74–99)
OSMOLALITY URINE: 284 MOSM/KG (ref 300–900)
POTASSIUM SERPL-SCNC: 5 MMOL/L (ref 3.5–5)
SODIUM BLD-SCNC: 133 MMOL/L (ref 132–146)

## 2018-11-11 PROCEDURE — 6370000000 HC RX 637 (ALT 250 FOR IP): Performed by: STUDENT IN AN ORGANIZED HEALTH CARE EDUCATION/TRAINING PROGRAM

## 2018-11-11 PROCEDURE — 82533 TOTAL CORTISOL: CPT

## 2018-11-11 PROCEDURE — 2580000003 HC RX 258: Performed by: THORACIC SURGERY (CARDIOTHORACIC VASCULAR SURGERY)

## 2018-11-11 PROCEDURE — 93798 PHYS/QHP OP CAR RHAB W/ECG: CPT

## 2018-11-11 PROCEDURE — 6370000000 HC RX 637 (ALT 250 FOR IP): Performed by: THORACIC SURGERY (CARDIOTHORACIC VASCULAR SURGERY)

## 2018-11-11 PROCEDURE — 82570 ASSAY OF URINE CREATININE: CPT

## 2018-11-11 PROCEDURE — 82088 ASSAY OF ALDOSTERONE: CPT

## 2018-11-11 PROCEDURE — 36415 COLL VENOUS BLD VENIPUNCTURE: CPT

## 2018-11-11 PROCEDURE — 84244 ASSAY OF RENIN: CPT

## 2018-11-11 PROCEDURE — 86235 NUCLEAR ANTIGEN ANTIBODY: CPT

## 2018-11-11 PROCEDURE — 2140000000 HC CCU INTERMEDIATE R&B

## 2018-11-11 PROCEDURE — 80048 BASIC METABOLIC PNL TOTAL CA: CPT

## 2018-11-11 PROCEDURE — 6370000000 HC RX 637 (ALT 250 FOR IP): Performed by: NURSE PRACTITIONER

## 2018-11-11 PROCEDURE — 83935 ASSAY OF URINE OSMOLALITY: CPT

## 2018-11-11 PROCEDURE — 6370000000 HC RX 637 (ALT 250 FOR IP): Performed by: PHYSICIAN ASSISTANT

## 2018-11-11 RX ADMIN — SENNOSIDES 8.6 MG: 8.6 TABLET, FILM COATED ORAL at 21:12

## 2018-11-11 RX ADMIN — FLUOXETINE HYDROCHLORIDE 40 MG: 20 CAPSULE ORAL at 08:48

## 2018-11-11 RX ADMIN — ASPIRIN 81 MG: 81 TABLET ORAL at 08:49

## 2018-11-11 RX ADMIN — MUPIROCIN: 20 OINTMENT TOPICAL at 08:48

## 2018-11-11 RX ADMIN — DOCUSATE SODIUM 100 MG: 100 CAPSULE, LIQUID FILLED ORAL at 21:12

## 2018-11-11 RX ADMIN — METOPROLOL TARTRATE 25 MG: 25 TABLET ORAL at 08:49

## 2018-11-11 RX ADMIN — FERROUS SULFATE TAB 325 MG (65 MG ELEMENTAL FE) 325 MG: 325 (65 FE) TAB at 08:47

## 2018-11-11 RX ADMIN — HYDROCODONE BITARTRATE AND ACETAMINOPHEN 1 TABLET: 5; 325 TABLET ORAL at 08:47

## 2018-11-11 RX ADMIN — Medication 10 ML: at 21:12

## 2018-11-11 RX ADMIN — APIXABAN 5 MG: 5 TABLET, FILM COATED ORAL at 08:50

## 2018-11-11 RX ADMIN — AMIODARONE HYDROCHLORIDE 400 MG: 200 TABLET ORAL at 21:11

## 2018-11-11 RX ADMIN — FERROUS SULFATE TAB 325 MG (65 MG ELEMENTAL FE) 325 MG: 325 (65 FE) TAB at 16:51

## 2018-11-11 RX ADMIN — Medication 500 MG: at 08:47

## 2018-11-11 RX ADMIN — HYDROCODONE BITARTRATE AND ACETAMINOPHEN 2 TABLET: 5; 325 TABLET ORAL at 21:12

## 2018-11-11 RX ADMIN — Medication 500 MG: at 21:11

## 2018-11-11 RX ADMIN — METOPROLOL TARTRATE 25 MG: 25 TABLET ORAL at 21:11

## 2018-11-11 RX ADMIN — ATORVASTATIN CALCIUM 10 MG: 10 TABLET, FILM COATED ORAL at 08:46

## 2018-11-11 RX ADMIN — POLYETHYLENE GLYCOL 3350 17 G: 17 POWDER, FOR SOLUTION ORAL at 21:11

## 2018-11-11 RX ADMIN — DOCUSATE SODIUM 100 MG: 100 CAPSULE, LIQUID FILLED ORAL at 08:46

## 2018-11-11 RX ADMIN — MUPIROCIN: 20 OINTMENT TOPICAL at 21:12

## 2018-11-11 RX ADMIN — HYDROCODONE BITARTRATE AND ACETAMINOPHEN 2 TABLET: 5; 325 TABLET ORAL at 16:51

## 2018-11-11 RX ADMIN — AMIODARONE HYDROCHLORIDE 400 MG: 200 TABLET ORAL at 08:49

## 2018-11-11 RX ADMIN — VENLAFAXINE HYDROCHLORIDE 75 MG: 75 CAPSULE, EXTENDED RELEASE ORAL at 08:48

## 2018-11-11 RX ADMIN — Medication 10 ML: at 08:48

## 2018-11-11 RX ADMIN — PANTOPRAZOLE SODIUM 40 MG: 40 TABLET, DELAYED RELEASE ORAL at 08:47

## 2018-11-11 RX ADMIN — HYDROCODONE BITARTRATE AND ACETAMINOPHEN 2 TABLET: 5; 325 TABLET ORAL at 02:57

## 2018-11-11 RX ADMIN — APIXABAN 5 MG: 5 TABLET, FILM COATED ORAL at 21:11

## 2018-11-11 RX ADMIN — MAGNESIUM GLUCONATE 500 MG ORAL TABLET 400 MG: 500 TABLET ORAL at 08:50

## 2018-11-11 RX ADMIN — MAGNESIUM HYDROXIDE 30 ML: 400 SUSPENSION ORAL at 08:48

## 2018-11-11 RX ADMIN — Medication 1 MG: at 08:49

## 2018-11-11 ASSESSMENT — PAIN DESCRIPTION - DESCRIPTORS
DESCRIPTORS: ACHING;CONSTANT;DISCOMFORT

## 2018-11-11 ASSESSMENT — PAIN SCALES - GENERAL
PAINLEVEL_OUTOF10: 1
PAINLEVEL_OUTOF10: 6
PAINLEVEL_OUTOF10: 3
PAINLEVEL_OUTOF10: 4
PAINLEVEL_OUTOF10: 0
PAINLEVEL_OUTOF10: 0
PAINLEVEL_OUTOF10: 3
PAINLEVEL_OUTOF10: 2
PAINLEVEL_OUTOF10: 4
PAINLEVEL_OUTOF10: 6
PAINLEVEL_OUTOF10: 7

## 2018-11-11 ASSESSMENT — PAIN DESCRIPTION - FREQUENCY
FREQUENCY: INTERMITTENT

## 2018-11-11 ASSESSMENT — PAIN DESCRIPTION - ORIENTATION
ORIENTATION: MID

## 2018-11-11 ASSESSMENT — PAIN DESCRIPTION - ONSET
ONSET: ON-GOING
ONSET: AWAKENED FROM SLEEP
ONSET: AWAKENED FROM SLEEP

## 2018-11-11 ASSESSMENT — PAIN DESCRIPTION - LOCATION
LOCATION: STERNUM

## 2018-11-11 ASSESSMENT — PAIN DESCRIPTION - PROGRESSION
CLINICAL_PROGRESSION: GRADUALLY IMPROVING
CLINICAL_PROGRESSION: GRADUALLY WORSENING

## 2018-11-11 ASSESSMENT — PAIN DESCRIPTION - PAIN TYPE
TYPE: SURGICAL PAIN

## 2018-11-11 NOTE — H&P
oxide  400 mg Oral Daily    mupirocin   Nasal BID           Meds prn:     ondansetron, acetaminophen, HYDROcodone 5 mg - acetaminophen **OR** HYDROcodone 5 mg - acetaminophen, bisacodyl, potassium chloride, sodium chloride flush, glucose, dextrose, glucagon (rDNA)    Meds prior to admission:     No current facility-administered medications on file prior to encounter. Current Outpatient Prescriptions on File Prior to Encounter   Medication Sig Dispense Refill    losartan (COZAAR) 25 MG tablet Take 1 tablet by mouth nightly 30 tablet 3    furosemide (LASIX) 20 MG tablet Take 1 tablet by mouth every morning (before breakfast) (Patient taking differently: Take 20 mg by mouth every morning (before breakfast) Patient taking qod) 60 tablet 3    LORazepam (ATIVAN) 1 MG tablet Take 1 mg by mouth every morning. True [place]agg atorvastatin (LIPITOR) 10 MG tablet Take 10 mg by mouth every morning       cyanocobalamin 1000 MCG/ML injection Inject 1,000 mcg into the muscle every 30 days       FLUoxetine (PROZAC) 40 MG capsule Take 40 mg by mouth every morning       venlafaxine (EFFEXOR XR) 75 MG extended release capsule Take 75 mg by mouth every morning       omeprazole (PRILOSEC) 40 MG delayed release capsule Take 40 mg by mouth daily as needed       rivaroxaban (XARELTO) 20 MG TABS tablet Take 20 mg by mouth Daily with supper          Allergies: Iv dye [iodides]; Pcn [penicillins]; and Adhesive tape    Social History:     reports that she quit smoking about 35 years ago. She has a 0.75 pack-year smoking history. She has never used smokeless tobacco. She reports that she drinks alcohol. She reports that she does not use drugs.     Family History:    No h/o kidney disease    ROS:     General: no fever, chills   Heent: no nasal congestion, sore throat   Resp: no cough, sob   Cardiac: no cp   Gi: no nausea, vomiting, melena, abd pain, nausea  Gu: no hematuria, dysuria   Neruo: no numbness, weakness, headache, blurry vision

## 2018-11-12 VITALS
WEIGHT: 205.8 LBS | DIASTOLIC BLOOD PRESSURE: 58 MMHG | HEIGHT: 65 IN | RESPIRATION RATE: 16 BRPM | TEMPERATURE: 96.6 F | BODY MASS INDEX: 34.29 KG/M2 | OXYGEN SATURATION: 97 % | SYSTOLIC BLOOD PRESSURE: 126 MMHG | HEART RATE: 69 BPM

## 2018-11-12 LAB
ANION GAP SERPL CALCULATED.3IONS-SCNC: 12 MMOL/L (ref 7–16)
BUN BLDV-MCNC: 18 MG/DL (ref 8–23)
CALCIUM SERPL-MCNC: 8.2 MG/DL (ref 8.6–10.2)
CHLORIDE BLD-SCNC: 98 MMOL/L (ref 98–107)
CO2: 24 MMOL/L (ref 22–29)
CREAT SERPL-MCNC: 0.9 MG/DL (ref 0.5–1)
ENA TO SMITH (SM) ANTIBODY: NEGATIVE
GFR AFRICAN AMERICAN: >60
GFR NON-AFRICAN AMERICAN: >60 ML/MIN/1.73
GLUCOSE BLD-MCNC: 92 MG/DL (ref 74–99)
OSMOLALITY: 283 MOSM/KG (ref 285–310)
POTASSIUM SERPL-SCNC: 4.7 MMOL/L (ref 3.5–5)
SODIUM BLD-SCNC: 134 MMOL/L (ref 132–146)
TSH SERPL DL<=0.05 MIU/L-ACNC: 8.13 UIU/ML (ref 0.27–4.2)

## 2018-11-12 PROCEDURE — 6370000000 HC RX 637 (ALT 250 FOR IP): Performed by: THORACIC SURGERY (CARDIOTHORACIC VASCULAR SURGERY)

## 2018-11-12 PROCEDURE — 2580000003 HC RX 258: Performed by: THORACIC SURGERY (CARDIOTHORACIC VASCULAR SURGERY)

## 2018-11-12 PROCEDURE — 93798 PHYS/QHP OP CAR RHAB W/ECG: CPT

## 2018-11-12 PROCEDURE — 36415 COLL VENOUS BLD VENIPUNCTURE: CPT

## 2018-11-12 PROCEDURE — 6370000000 HC RX 637 (ALT 250 FOR IP): Performed by: STUDENT IN AN ORGANIZED HEALTH CARE EDUCATION/TRAINING PROGRAM

## 2018-11-12 PROCEDURE — 6370000000 HC RX 637 (ALT 250 FOR IP): Performed by: PHYSICIAN ASSISTANT

## 2018-11-12 PROCEDURE — 6370000000 HC RX 637 (ALT 250 FOR IP): Performed by: NURSE PRACTITIONER

## 2018-11-12 PROCEDURE — 84443 ASSAY THYROID STIM HORMONE: CPT

## 2018-11-12 PROCEDURE — 83930 ASSAY OF BLOOD OSMOLALITY: CPT

## 2018-11-12 PROCEDURE — 80048 BASIC METABOLIC PNL TOTAL CA: CPT

## 2018-11-12 RX ORDER — FOLIC ACID 1 MG/1
1 TABLET ORAL DAILY
Qty: 30 TABLET | Refills: 0 | Status: SHIPPED | OUTPATIENT
Start: 2018-11-12 | End: 2018-12-06 | Stop reason: ALTCHOICE

## 2018-11-12 RX ORDER — PSEUDOEPHEDRINE HCL 30 MG
100 TABLET ORAL 2 TIMES DAILY PRN
Qty: 20 CAPSULE | Refills: 0 | Status: SHIPPED | OUTPATIENT
Start: 2018-11-12 | End: 2018-12-06 | Stop reason: ALTCHOICE

## 2018-11-12 RX ORDER — FERROUS SULFATE 325(65) MG
325 TABLET ORAL 2 TIMES DAILY WITH MEALS
Qty: 60 TABLET | Refills: 0 | Status: SHIPPED | OUTPATIENT
Start: 2018-11-12 | End: 2018-12-06 | Stop reason: ALTCHOICE

## 2018-11-12 RX ORDER — HYDROCODONE BITARTRATE AND ACETAMINOPHEN 5; 325 MG/1; MG/1
1 TABLET ORAL EVERY 4 HOURS PRN
Qty: 42 TABLET | Refills: 0 | Status: SHIPPED | OUTPATIENT
Start: 2018-11-12 | End: 2018-11-19

## 2018-11-12 RX ORDER — ASPIRIN 81 MG/1
81 TABLET ORAL DAILY
Qty: 30 TABLET | Refills: 1 | Status: SHIPPED | OUTPATIENT
Start: 2018-11-12

## 2018-11-12 RX ORDER — AMIODARONE HYDROCHLORIDE 200 MG/1
TABLET ORAL
Qty: 44 TABLET | Refills: 0 | Status: SHIPPED | OUTPATIENT
Start: 2018-11-12 | End: 2018-12-06 | Stop reason: ALTCHOICE

## 2018-11-12 RX ORDER — ASCORBIC ACID 500 MG
500 TABLET ORAL 2 TIMES DAILY
Qty: 60 TABLET | Refills: 0 | COMMUNITY
Start: 2018-11-12 | End: 2018-12-06 | Stop reason: ALTCHOICE

## 2018-11-12 RX ADMIN — APIXABAN 5 MG: 5 TABLET, FILM COATED ORAL at 08:53

## 2018-11-12 RX ADMIN — Medication 500 MG: at 08:53

## 2018-11-12 RX ADMIN — PANTOPRAZOLE SODIUM 40 MG: 40 TABLET, DELAYED RELEASE ORAL at 09:17

## 2018-11-12 RX ADMIN — ATORVASTATIN CALCIUM 10 MG: 10 TABLET, FILM COATED ORAL at 09:13

## 2018-11-12 RX ADMIN — POLYETHYLENE GLYCOL 3350 17 G: 17 POWDER, FOR SOLUTION ORAL at 09:17

## 2018-11-12 RX ADMIN — MAGNESIUM GLUCONATE 500 MG ORAL TABLET 400 MG: 500 TABLET ORAL at 09:14

## 2018-11-12 RX ADMIN — FLUOXETINE HYDROCHLORIDE 40 MG: 20 CAPSULE ORAL at 09:13

## 2018-11-12 RX ADMIN — AMIODARONE HYDROCHLORIDE 400 MG: 200 TABLET ORAL at 09:12

## 2018-11-12 RX ADMIN — FERROUS SULFATE TAB 325 MG (65 MG ELEMENTAL FE) 325 MG: 325 (65 FE) TAB at 09:13

## 2018-11-12 RX ADMIN — DOCUSATE SODIUM 100 MG: 100 CAPSULE, LIQUID FILLED ORAL at 08:53

## 2018-11-12 RX ADMIN — ASPIRIN 81 MG: 81 TABLET ORAL at 09:12

## 2018-11-12 RX ADMIN — MUPIROCIN: 20 OINTMENT TOPICAL at 09:18

## 2018-11-12 RX ADMIN — HYDROCODONE BITARTRATE AND ACETAMINOPHEN 2 TABLET: 5; 325 TABLET ORAL at 14:15

## 2018-11-12 RX ADMIN — HYDROCODONE BITARTRATE AND ACETAMINOPHEN 2 TABLET: 5; 325 TABLET ORAL at 03:19

## 2018-11-12 RX ADMIN — HYDROCODONE BITARTRATE AND ACETAMINOPHEN 2 TABLET: 5; 325 TABLET ORAL at 08:53

## 2018-11-12 RX ADMIN — Medication 10 ML: at 08:53

## 2018-11-12 RX ADMIN — VENLAFAXINE HYDROCHLORIDE 75 MG: 75 CAPSULE, EXTENDED RELEASE ORAL at 09:14

## 2018-11-12 RX ADMIN — METOPROLOL TARTRATE 25 MG: 25 TABLET ORAL at 09:12

## 2018-11-12 RX ADMIN — Medication 1 MG: at 09:13

## 2018-11-12 ASSESSMENT — PAIN SCALES - GENERAL
PAINLEVEL_OUTOF10: 2
PAINLEVEL_OUTOF10: 6
PAINLEVEL_OUTOF10: 7
PAINLEVEL_OUTOF10: 7
PAINLEVEL_OUTOF10: 3
PAINLEVEL_OUTOF10: 3
PAINLEVEL_OUTOF10: 4

## 2018-11-12 ASSESSMENT — PAIN DESCRIPTION - ONSET
ONSET: AWAKENED FROM SLEEP
ONSET: ON-GOING
ONSET: ON-GOING

## 2018-11-12 ASSESSMENT — PAIN DESCRIPTION - LOCATION
LOCATION: STERNUM
LOCATION: CHEST
LOCATION: CHEST

## 2018-11-12 ASSESSMENT — PAIN DESCRIPTION - FREQUENCY
FREQUENCY: INTERMITTENT

## 2018-11-12 ASSESSMENT — PAIN DESCRIPTION - PAIN TYPE
TYPE: SURGICAL PAIN

## 2018-11-12 ASSESSMENT — PAIN DESCRIPTION - DESCRIPTORS
DESCRIPTORS: ACHING;DISCOMFORT;DULL
DESCRIPTORS: ACHING;CONSTANT;DISCOMFORT

## 2018-11-12 ASSESSMENT — PAIN DESCRIPTION - ORIENTATION: ORIENTATION: MID

## 2018-11-12 ASSESSMENT — PAIN DESCRIPTION - PROGRESSION: CLINICAL_PROGRESSION: GRADUALLY WORSENING

## 2018-11-12 NOTE — PROGRESS NOTES
All discharge paperwork, follow up appointments, and medication details provided to patient and . Information faxed to [facility] at this time.     Mo Martell RN
Dr. Elizabeth Forbes answering service called regarding consult.
Dr. Warren White was consulted on this pt @ 0113 pm.
GENERAL SURGERY  DAILY PROGRESS NOTE  11/10/2018    Subjective:  Complains of intermittent mild nausea, had BM yesterday, tolerating light diet with minimal appetite    Objective:  BP (!) 146/69   Pulse 71   Temp 97 °F (36.1 °C) (Oral)   Resp 16   Ht 5' 5\" (1.651 m)   Wt 208 lb 11.2 oz (94.7 kg)   SpO2 95%   BMI 34.73 kg/m²     GENERAL:  Laying in bed, awake, alert, cooperative, no apparent distress  LUNGS:  No increased work of breathing, no cyanosis  CARDIOVASCULAR:  Extremities warm and well perfused,   ABDOMEN:  Soft, no tenderness, moderately distended,   EXTREMITIES: No edema or swelling  SKIN: Warm and dry    Assessment/Plan:  80 y.o. female with resolving ileus    -ok for diet, recommend slow advance of diet, may have ensure if poor appetite  -minimize narcotic pain medication  -encourage ambulation  -please call for further surgical questions or concerns  -gum and hard candy    Electronically signed by Tara Comer MD on 11/10/2018 at 8:09 AM
INPATIENT CARDIOLOGY FOLLOW-UP    Name: Saleem Loja    Age: 80 y.o. Date of Admission: 11/6/2018  9:45 AM    Date of Service: 11/6/2018    Chief Complaint: Follow-up for post op care related to Mitral valve repair with 28mm Physio ring/Tricuspid valve repair with 28 mm Medtronic 3D band/Right and left cryo and bipolar MAZE procedure/PIOTR exclusion with 35 mm AtriClip/Complex sternal reconstruction with Yue weave bilaterally 11/6/2018 Dr. Travis Sandy. Patient known to DR. Payton. Interim History:  Post Op patient is intubated and sedated. On EPI, chest tubes times 4. On amio    Review of Systems:   DEE due to current clinical situation     Problem List:  Patient Active Problem List   Diagnosis    Sick sinus syndrome ([address]    Persistent atrial fibrillation (HCC)    Atrial fibrillation with slow ventricular response (HCC)    Symptomatic bradycardia    Near syncope    Myelodysplastic syndrome (HCC)    Stroke-like symptom    History of unsteady gait    Pacemaker    Non-rheumatic mitral regurgitation    SOB (shortness of breath)    Severe mitral regurgitation       Allergies:   Allergies   Allergen Reactions    Iv Dye [Iodides] Swelling    Pcn [Penicillins] Swelling    Adhesive Tape Rash       Current Medications:  Current Facility-Administered Medications   Medication Dose Route Frequency Provider Last Rate Last Dose    amiodarone (CORDARONE) 450 mg in dextrose 5 % 250 mL infusion  0.5 mg/min Intravenous Continuous Tamara Jefferson MD        [START ON 11/7/2018] atorvastatin (LIPITOR) tablet 10 mg  10 mg Oral NISHA Jefferson MD        [START ON 11/7/2018] FLUoxetine (PROZAC) capsule 40 mg  40 mg Oral NISHA Jefferson MD        [START ON 11/7/2018] venlafaxine (EFFEXOR XR) extended release capsule 75 mg  75 mg Oral NISHA Jefferson MD        0.9 % sodium chloride infusion  30 mL/hr Intravenous Continuous Tamara Jefferson MD        sodium chloride flush 0.9 % injection 10 mL  10 mL
[facility]) Physicians        CARDIOLOGY                 INPATIENT PROGRESS NOTE          PATIENT SEEN IN FOLLOW UP FOR: post op f/o of [address] [facility] Day: 3     Flaquita Bautista is a 80year old patient known to Dr. Gisella Owens: Denies any CP or SOB, no dizzy;    ROS: Review of rest of 8 systems negative except as mentioned above    OBJECTIVE: No acute distress. See Assessment     Diagnostics:       Telemetry: Ventricular paced          Intake/Output Summary (Last 24 hours) at 11/08/18 1618  Last data filed at 11/08/18 1436   Gross per 24 hour   Intake             1698 ml   Output              910 ml   Net              788 ml       Labs:   CBC:   Recent Labs      11/07/18   0408  11/08/18   0550   WBC  33.2*  24.7*   HGB  9.5*  7.8*   HCT  30.6*  26.0*   PLT  251  180     BMP:   Recent Labs      11/07/18   0408  11/07/18 2027  11/08/18   0550   NA  145   --   137   K  5.0  5.0  5.4*   CO2  19*   --   21*   BUN  18   --   27*   CREATININE  1.0   --   1.2*   LABGLOM  55   --   45   CALCIUM  8.0*   --   8.2*     Mag:   Recent Labs      11/06/18   1545  11/07/18   0408   MG  3.1*  2.6     Phos: No results for input(s): PHOS in the last 72 hours. TSH: No results for input(s): TSH in the last 72 hours. HgA1c:     BNP: No results for input(s): BNP in the last 72 hours. PT/INR:   Recent Labs      11/06/18   1545   PROTIME  14.6*   INR  1.3     APTT:  Recent Labs      11/06/18   1545   APTT  32.5     CARDIAC ENZYMES:No results for input(s): CKTOTAL, CKMB, CKMBINDEX, TROPONINI in the last 72 hours. FASTING LIPID PANEL:No results found for: CHOL, HDL, LDLDIRECT, LDLCALC, TRIG  LIVER PROFILE:No results for input(s): AST, ALT, LABALBU in the last 72 hours.     Current Inpatient Medications:   furosemide  20 mg Intravenous BID    amiodarone  400 mg Oral BID    magnesium hydroxide  30 mL Oral Daily    docusate sodium  100 mg Oral BID    aspirin  81 mg Oral Daily    ferrous sulfate  325 mg
POD# 3 Awake, alert. Complains of Nausea again this AM     Vitals:    11/09/18 0400 11/09/18 0740 11/09/18 0918 11/09/18 1007   BP: (!) 124/57 (!) 102/53     Pulse: 70 70     Resp: 18 18     Temp: 97.9 °F (36.6 °C) 97.9 °F (36.6 °C)     TempSrc: Oral Temporal     SpO2: 92% 94% 95% 90%   Weight:       Height:           Intake/Output Summary (Last 24 hours) at 11/09/18 1054  Last data filed at 11/09/18 0933   Gross per 24 hour   Intake           1255.8 ml   Output             1550 ml   Net           -294.2 ml     Recent Labs      11/09/18   0532   HGB  8.7*   HCT  27.7*   BUN  29*   CREATININE  1.0        PE  Cardiac: paced-- PPM   Lungs: decreased bases  Chest PIERCE dressing intact  Sternum stable  Abd: some distention, + BS  Ext: DELCID     A/P: Stable s/p MVrp, TVrp, Maze, PIOTR exclusion, POD#3    Acute blood loss anemia secondary to open heart surgery-- s/p transfusion yesterday   Creat stable-- diurese again today   K 5.5 today, will recheck this afternoon following diuresis. -- close monitor. No ectopy on heart monitor. Nausea and abd distention, No BM -- will consult Gen surgery for poss ileus   Pre op Afib- was on Xerelto pre op. Discussed with attending will start Eliquis likely tomm  CTs x 2 removed. Chest tube(s) removed without difficulty. Patient tolerated well  Epicardial pacing wires cut without difficulty. Patient tolerated well  On RA, cont as tolerated  Ambulating 100ft, discussed with patient and SW. Patient would like to go home at [place], discussed ambulation goals. Will see how she does over next 24 hours.      Increase activity as tolerated   Encourage incentive spirometry  This patient's case and care plan was discussed with the attending surgeon
POD# 4  Awake, alert. Sitting OOB in chair. Complains of fatigue. Reports nausea somewhat improved    Vitals:    11/09/18 2014 11/09/18 2310 11/10/18 0328 11/10/18 0645   BP: 122/70 128/64 (!) 146/69    Pulse: 75 70 71    Resp: 18 16 16    Temp: 98.2 °F (36.8 °C) 97.9 °F (36.6 °C) 97 °F (36.1 °C)    TempSrc: Oral Oral Oral    SpO2: 94% 92% 95% 95%   Weight:   208 lb 11.2 oz (94.7 kg)    Height:           Intake/Output Summary (Last 24 hours) at 11/10/18 0815  Last data filed at 11/10/18 0533   Gross per 24 hour   Intake             1330 ml   Output             2000 ml   Net             -670 ml     Recent Labs      11/09/18   0532   11/10/18   0536   HGB  8.7*   --    --    HCT  27.7*   --    --    BUN  29*   < >  23   CREATININE  1.0   < >  0.9    < > = values in this interval not displayed. PE  Cardiac: paced-- PPM  Lungs: decreased bases  Chest PIERCE dressing intact  Sternum stable  Abd: Soft, +BS  Ext: DELCID. Right groin suture intact     A/P: Stable s/p MVrp, TVrp, Maze, PIOTR exclusion, POD#3    Acute blood loss anemia secondary to open heart surgery-- has been stable   Creat stable-- K improved. Today 4.9. - has received mult diuretic doses. Will recheck this afternoon  Nausea and Abd distention, BM yesterday. Gen surgery following. Cont to advance diet. Pre op Afib- was on Xerelto pre op. Discussed with attending will start Eliquis today  HTN- hx of PPM.  Per attending will initiate BB today.   Ramon PO amio   On RA, cont as tolerated   Ambulating only 100ft, home vx rehab pending progress over next 24-48 hours    Increase activity as tolerated   Encourage incentive spirometry  This patient's case and care plan was discussed with the attending surgeon
POD# 5  Awake, alert. No complaints. Sitting OOB in chair   Vitals:    11/11/18 0030 11/11/18 0430 11/11/18 0730 11/11/18 0846   BP: (!) 111/50 (!) 105/58  132/64   Pulse: 70 70  70   Resp: 16 16     Temp: 97.9 °F (36.6 °C) 98 °F (36.7 °C)     TempSrc: Oral Oral     SpO2: 92% 92% 96%    Weight:  207 lb 6.4 oz (94.1 kg)     Height:           Intake/Output Summary (Last 24 hours) at 11/11/18 0851  Last data filed at 11/11/18 0430   Gross per 24 hour   Intake              600 ml   Output             2000 ml   Net            -1400 ml     Recent Labs      11/09/18   0532   11/10/18   1456   HGB  8.7*   --    --    HCT  27.7*   --    --    BUN  29*   < >  23   CREATININE  1.0   < >  0.9    < > = values in this interval not displayed. PE  Cardiac:  Paced-- PPM   Lungs: decreased bases  Chest PIERCE dressing intact  Sternum stable  Abd: Soft, +BS  Ext: DELCID, Right groin suture intact     A/P: Stable s/p MVrp, TVrp, Maze, PIOTR exclusion, POD# 5   Acute blood loss anemia secondary to open heart surgery-- has been stable   Creat stable. K+ elevated to 5.2 again, and sodium 129, will consult nephrology for further recommendations   Nausea and Abd distention improved. Gen surgery on board. Cont diet. BB started yesterday-- cont as cecile.   BP stable   Pre op afib, was on home xeralto, Eliquis initiated yesterday   On RA, cont as tolerated  Ambulating 200 ft, will monitor progress over next 24 hours, home vs rehab over next 1-2 days    Increase activity as tolerated   Encourage incentive spirometry  This patient's case and care plan was discussed with the attending surgeon
POD#6  Awake, alert. No complaints. Ambulated 400 feet on RA    Vitals:    11/12/18 0430 11/12/18 0510 11/12/18 0645 11/12/18 0853   BP: (!) 117/58   (!) 126/58   Pulse: 70   69   Resp: 16   16   Temp: 97.5 °F (36.4 °C)   96.6 °F (35.9 °C)   TempSrc: Oral   Temporal   SpO2: 94%  95% 97%   Weight:  205 lb 12.8 oz (93.4 kg)     Height:           Intake/Output Summary (Last 24 hours) at 11/12/18 1013  Last data filed at 11/12/18 0510   Gross per 24 hour   Intake              600 ml   Output             3225 ml   Net            -2625 ml     Recent Labs      11/12/18   0505   BUN  18   CREATININE  0.9        PE  Cardiac: RRR- chronic pacer  Lungs: decreased bases  PIERCE in place.  Sternum stable  Abd: Soft, +BS  Ext: DELCID    A/P: Stable s/p MVrp, TVrp, Maze, PIOTR exclusion, POD#6   Acute blood loss anemia secondary to open heart surgery- stable  Creat stable, Na and K improved this am  Afib- eliquis started  Hemodynamics stable  Groin stitch removed  Walked 400 feet on RA  DC home today   Increase activity as tolerated   Encourage incentive spirometry  This patient's case and care plan was discussed with the attending surgeon
Physical Therapy  Initial Assessment     Name: Noe Brittle  : 1950  MRN: [id number]    Date of Service: 2018    Evaluating PT:  Verneda Lesches, PT, DPT, [id number]    Room #:  9755/5575-W  Diagnosis:  Severe mitral regurgitation   Precautions:  Falls, sternal precautions, O2  Procedures:  Sternotomy/Mitral valve repair/Tricuspid valve repair/Right and left cryo and bipolar MAZE procedure/PIOTR exclusion/Complex sternal reconstruction    Equipment Needs:  None     Pt lives with  in a 1 story home with 4 stair(s) to enter and 1 rail(s). Bed is on 1 floor and bath is on 1 floor. Pt ambulated with no AD PTA. Pt independent for ADL performance. Initial Evaluation  Date: 18 Treatment Short Term/ Long Term   Goals   AM-PAC 6 Clicks 81/85     Was pt agreeable to Eval/treatment? yes     Does pt have pain? 8/10 incisional      Bed Mobility  Rolling: Landon  Supine to sit: Landon  Sit to supine: modA  Scooting: Landon  Mod I    Transfers Sit to stand: SBA  Stand to sit: SBA  Stand pivot: NT  Mod I    Ambulation    75 feet with no AD Landon  >300 feet with no AD Independent   Stair negotiation: ascended and descended  NT  4 steps with 0 rail SBA   ROM BUE:  See OT eval  BLE:  WFL     Strength BUE:  See OT eval  BLE grossly:  4-/5  4+/5   Balance Sitting EOB:  SBA  Dynamic Standing:  Landon   Sitting EOB:  Independent  Dynamic Standing:  Independent     Pt is A & O x 3  Sensation:  Pt denies numbness and tingling to extremities  Edema:  Unremarkable     Patient education  Pt educated on safety, sequencing during transfers, sternal precautions, and role of PT     Patient response to education:   Pt verbalized understanding Pt demonstrated skill Pt requires further education in this area   yes Partial with cues Yes      Assessment/Comments    Pt supine in bed upon entry to room. RN cleared pt for eval. Agreeable to PT evaluation.  Pt educated on sternal precautions and use of bear for bracing -- expressed
STAT labs called for 1500 BMP to be drawn.
Postoperative hypotension  - Epinephrine infusion on arrival to maintain MAP>65   - IVF resuscitation as needed, wean epi gtt slowly as able     5.  Acute Post Operative Pain   -PRN morphine/fentanyl while intubated, start PRN PO regimen once extubated       Electronically signed by COLLEEN Rubio - CNP on 11/6/2018 at 3:46 PM

## 2018-11-14 LAB — ALDOSTERONE: 35.6 NG/DL

## 2018-11-14 NOTE — DISCHARGE SUMMARY
Physician Discharge Summary     Patient ID:  Bessy Richardson  [id number]  85 y.o.  1950    Admit date: 11/6/2018    Discharge date and time: 11/12/2018  4:23 PM     Admitting Physician: Gertrudis Rodgers MD     Discharge Physician: Ron Pollack MD     Admission Diagnoses: Severe mitral regurgitation [I34.0]    Discharge Diagnoses: Severe mitral regurgitation; moderate  tricuspid regurgitation; persistent atrial fibrillation; congestive  heart failure, New York Heart Association class III; acute-on-chronic  systolic and diastolic heart failure; anemia; anxiety; bradycardia,  status post pacemaker; depression; extensive abdominal surgery    OPERATIONS:  1. Sternotomy. 2.  Mitral valve repair with a 28 mm Wiggins Physio complete ring. 3.  Tricuspid valve repair with a 28 mm Medtronic contour 3D band. 4.  Right and left cryo and bipolar maze procedure. 5.  Left atrial appendage exclusion with a 35-mm AtriClip. 6.  Complex sternal reconstruction with a Robicsek weave bilaterally. Discharged Condition: stable    Indication for Admission: This is a 43-year-old female who is in atrial fibrillation. She has also had some bradycardia, pacemaker was placed. She underwent  a PRUDENCE guided cardioversion and was noted to have severe mitral  regurgitation as well as moderate tricuspid regurgitation. She  underwent a cardiac cath that showed a proximal LAD lesion about 30% to  40%, which I did not feel needed bypass. She was referred for mitral  and tricuspid repair with a maze procedure. The patient was described  the procedure in full including the risks and complications including,  but not limited to bleeding, infection, need for reoperation,  hemothorax, pneumothorax, stroke, myocardial infarction, and death; and  the patient agreed to proceed.     Hospital Course: Course as above and on 11/6/18 patient underwent Sternotomy/Mitral valve repair with 28mm Physio ring/Tricuspid valve repair with 28 mm Medtronic 3D

## 2018-11-15 LAB — RENIN ACTIVITY: 10.7 NG/ML/HR

## 2018-12-04 ENCOUNTER — OFFICE VISIT (OUTPATIENT)
Dept: CARDIOTHORACIC SURGERY | Age: 68
End: 2018-12-04

## 2018-12-04 VITALS — SYSTOLIC BLOOD PRESSURE: 121 MMHG | BODY MASS INDEX: 30.95 KG/M2 | DIASTOLIC BLOOD PRESSURE: 70 MMHG | WEIGHT: 186 LBS

## 2018-12-04 DIAGNOSIS — Z98.890 S/P MAZE OPERATION FOR ATRIAL FIBRILLATION: ICD-10-CM

## 2018-12-04 DIAGNOSIS — Z98.890 S/P MITRAL VALVE REPAIR: Primary | ICD-10-CM

## 2018-12-04 DIAGNOSIS — Z98.890 S/P TRICUSPID VALVE REPAIR: ICD-10-CM

## 2018-12-04 DIAGNOSIS — Z86.79 S/P MAZE OPERATION FOR ATRIAL FIBRILLATION: ICD-10-CM

## 2018-12-04 PROCEDURE — 99024 POSTOP FOLLOW-UP VISIT: CPT | Performed by: THORACIC SURGERY (CARDIOTHORACIC VASCULAR SURGERY)

## 2018-12-04 ASSESSMENT — ENCOUNTER SYMPTOMS
COUGH: 0
ABDOMINAL PAIN: 0
CONSTIPATION: 0
SHORTNESS OF BREATH: 0

## 2018-12-04 NOTE — LETTER
[address] [place]  [address] [facility],[address] [place] [address]  Phone: [phone]  Fax: [phone]    Mana Camara MD        December 4, 2018     Yong Tran MD, MD  [facility] [id number]    Patient: Stacey Vicente  MR Number: [id number]  YOB: 1950  Date of Visit: 12/4/2018    Dear Dr. Yong Tran MD:    Thank you for the request for consultation for Stacey Vicente     Past Medical History:   Diagnosis Date    Anemia     Anxiety     Atrial fibrillation ([address]     Bradycardia     Chest pain     Depression     Dyspnea     GERD (gastroesophageal reflux disease)     Lung nodule     SSS (sick sinus syndrome) ([address]     Symptomatic bradycardia     Transaminitis        Past Surgical History:   Procedure Laterality Date    ABDOMEN SURGERY      BARIATRIC SURGERY      CARDIAC CATHETERIZATION  09/28/2018    [facility]-Dr. Arianne Lai, AU 7-70    CARDIOVERSION  09/05/2018    Dr. Jeanine Alvarez ENDOSCOPY, COLON, DIAGNOSTIC      PACEMAKER INSERTION  07/13/ 2018    [facility] - Dr.Katz Lv DREW OFFICE/OUTPT VISIT,PROCEDURE ONLY N/A 11/6/2018    MITRAL VALVE REPAIR VS. REPLACEMENT, TRICUSPID VALVE REPAIR, MAZE PROCEDURE, PRUDENCE WITH DELNIDO performed by Mana Camara MD at [place] [address] TRANSESOPHAGEAL ECHOCARDIOGRAM  09/05/2018    Dr. Meghan Scott       History reviewed. No pertinent family history.     Allergies   Allergen Reactions    Iv Dye [Iodides] Swelling    Pcn [Penicillins] Swelling    Adhesive Tape Rash         Current Outpatient Prescriptions:     aspirin 81 MG EC tablet, Take 1 tablet by mouth daily, Disp: 30 tablet, Rfl: 1    amiodarone (CORDARONE) 200 MG tablet, Take 400 mg orally twice daily for five days, then take 200 mg orally twice daily for five days, then take 200 mg orally daily for fourteen days, then discontinue, Disp: 44 tablet, Rfl: 0   apixaban (ELIQUIS) 5 MG TABS tablet, Take 1 tablet by mouth 2 times daily, Disp: 60 tablet, Rfl: 1    metoprolol tartrate (LOPRESSOR) 25 MG tablet, Take 1 tablet by mouth 2 times daily, Disp: 60 tablet, Rfl: 1    ferrous sulfate 325 (65 Fe) MG tablet, Take 1 tablet by mouth 2 times daily (with meals), Disp: 60 tablet, Rfl: 0    folic acid (FOLVITE) 1 MG tablet, Take 1 tablet by mouth daily, Disp: 30 tablet, Rfl: 0    docusate (COLACE, DULCOLAX) 100 MG CAPS, Take 100 mg by mouth 2 times daily as needed (constipation), Disp: 20 capsule, Rfl: 0    ascorbic acid (VITAMIN C) 500 MG tablet, Take 1 tablet by mouth 2 times daily, Disp: 60 tablet, Rfl: 0    LORazepam (ATIVAN) 1 MG tablet, Take 1 mg by mouth every morning. ., Disp: , Rfl:     atorvastatin (LIPITOR) 10 MG tablet, Take 10 mg by mouth every morning , Disp: , Rfl:     cyanocobalamin 1000 MCG/ML injection, Inject 1,000 mcg into the muscle every 30 days , Disp: , Rfl:     FLUoxetine (PROZAC) 40 MG capsule, Take 40 mg by mouth every morning , Disp: , Rfl:     venlafaxine (EFFEXOR XR) 75 MG extended release capsule, Take 75 mg by mouth every morning , Disp: , Rfl:     omeprazole (PRILOSEC) 40 MG delayed release capsule, Take 40 mg by mouth daily as needed , Disp: , Rfl:     Social History   Substance Use Topics    Smoking status: Former Smoker     Packs/day: 0.25     Years: 3.00     Quit date: 7/12/1983    Smokeless tobacco: Never Used    Alcohol use Yes      Comment: rare wine       Vitals:    12/04/18 0915   BP: 121/70   Site: Left Upper Arm   Position: Sitting   Weight: 186 lb (84.4 kg)       Subjective:      Patient ID: Lee Cyr is a 80 y.o. female. Chief Complaint   Patient presents with    Post-Op Check     MVR/TVR/MAZE 11/6/18     She presents for routine 4 week follow up status post:  OPERATIONS:  1.  Sternotomy. 2.  Mitral valve repair with a 28 mm Wiggins Physio complete ring. If you have questions, please do not hesitate to call me. I look forward to following Helen Watson along with you.     Sincerely,        Parviz Harper MD

## 2018-12-06 ENCOUNTER — OFFICE VISIT (OUTPATIENT)
Dept: CARDIOLOGY CLINIC | Age: 68
End: 2018-12-06
Payer: MEDICARE

## 2018-12-06 ENCOUNTER — HOSPITAL ENCOUNTER (OUTPATIENT)
Dept: CARDIAC REHAB | Age: 68
Setting detail: THERAPIES SERIES
Discharge: HOME | End: 2018-12-06
Payer: MEDICARE

## 2018-12-06 VITALS
SYSTOLIC BLOOD PRESSURE: 118 MMHG | WEIGHT: 188 LBS | HEART RATE: 86 BPM | BODY MASS INDEX: 28.49 KG/M2 | HEIGHT: 68 IN | DIASTOLIC BLOOD PRESSURE: 60 MMHG

## 2018-12-06 DIAGNOSIS — I48.0 PAF (PAROXYSMAL ATRIAL FIBRILLATION) (HCC): Primary | ICD-10-CM

## 2018-12-06 DIAGNOSIS — Z98.890 H/O MITRAL VALVE REPAIR: ICD-10-CM

## 2018-12-06 DIAGNOSIS — Z98.890 H/O TRICUSPID VALVE REPAIR: ICD-10-CM

## 2018-12-06 PROCEDURE — 99214 OFFICE O/P EST MOD 30 MIN: CPT | Performed by: INTERNAL MEDICINE

## 2018-12-06 PROCEDURE — 93000 ELECTROCARDIOGRAM COMPLETE: CPT | Performed by: INTERNAL MEDICINE

## 2018-12-06 PROCEDURE — 1101F PT FALLS ASSESS-DOCD LE1/YR: CPT | Performed by: INTERNAL MEDICINE

## 2018-12-06 PROCEDURE — 3017F COLORECTAL CA SCREEN DOC REV: CPT | Performed by: INTERNAL MEDICINE

## 2018-12-06 PROCEDURE — G8484 FLU IMMUNIZE NO ADMIN: HCPCS | Performed by: INTERNAL MEDICINE

## 2018-12-06 PROCEDURE — G8417 CALC BMI ABV UP PARAM F/U: HCPCS | Performed by: INTERNAL MEDICINE

## 2018-12-06 PROCEDURE — G8400 PT W/DXA NO RESULTS DOC: HCPCS | Performed by: INTERNAL MEDICINE

## 2018-12-06 PROCEDURE — 1111F DSCHRG MED/CURRENT MED MERGE: CPT | Performed by: INTERNAL MEDICINE

## 2018-12-06 PROCEDURE — 1123F ACP DISCUSS/DSCN MKR DOCD: CPT | Performed by: INTERNAL MEDICINE

## 2018-12-06 PROCEDURE — G8427 DOCREV CUR MEDS BY ELIG CLIN: HCPCS | Performed by: INTERNAL MEDICINE

## 2018-12-06 PROCEDURE — 1090F PRES/ABSN URINE INCON ASSESS: CPT | Performed by: INTERNAL MEDICINE

## 2018-12-06 PROCEDURE — 4040F PNEUMOC VAC/ADMIN/RCVD: CPT | Performed by: INTERNAL MEDICINE

## 2018-12-06 PROCEDURE — 1036F TOBACCO NON-USER: CPT | Performed by: INTERNAL MEDICINE

## 2018-12-07 NOTE — PROGRESS NOTES
tablet Take 1 mg by mouth every morning. .   cyanocobalamin 1000 MCG/ML injection Inject 1,000 mcg into the muscle every 30 days    FLUoxetine (PROZAC) 40 MG capsule Take 40 mg by mouth every morning    venlafaxine (EFFEXOR XR) 75 MG extended release capsule Take 75 mg by mouth every morning    omeprazole (PRILOSEC) 40 MG delayed release capsule Take 40 mg by mouth daily as needed      I would like her to follow up in 3 months. In the interim, we will refer her to outpatient cardiac rehab 3 times a week for 8 weeks. When she follows up with us, we can discuss whether or not she can be taken off apixaban and continued on aspirin alone. I thank Dr. Sangeeta Silva for asking our advice regarding her care.       DAB/tlr

## 2018-12-11 ENCOUNTER — HOSPITAL ENCOUNTER (OUTPATIENT)
Dept: CARDIAC REHAB | Age: 68
Setting detail: THERAPIES SERIES
Discharge: HOME | End: 2018-12-11
Payer: MEDICARE

## 2018-12-13 ENCOUNTER — HOSPITAL ENCOUNTER (OUTPATIENT)
Dept: CARDIAC REHAB | Age: 68
Setting detail: THERAPIES SERIES
Discharge: HOME | End: 2018-12-13
Payer: MEDICARE

## 2018-12-17 ENCOUNTER — HOSPITAL ENCOUNTER (OUTPATIENT)
Dept: CARDIAC REHAB | Age: 68
Setting detail: THERAPIES SERIES
Discharge: HOME | End: 2018-12-17
Payer: MEDICARE

## 2018-12-18 ENCOUNTER — TELEPHONE (OUTPATIENT)
Dept: CARDIOTHORACIC SURGERY | Age: 68
End: 2018-12-18

## 2018-12-18 NOTE — TELEPHONE ENCOUNTER
Pt states that after surgery she lost a crown. She was at the dentist today at the present time he said it looks good no signs of infection because the coating is still in place. However if it would come off it can present a big problem and infection. He would like to know the soonest he can do oral surgery.

## 2018-12-19 ENCOUNTER — HOSPITAL ENCOUNTER (OUTPATIENT)
Dept: CARDIAC REHAB | Age: 68
Setting detail: THERAPIES SERIES
Discharge: HOME | End: 2018-12-19
Payer: MEDICARE

## 2018-12-20 ENCOUNTER — HOSPITAL ENCOUNTER (OUTPATIENT)
Dept: CARDIAC REHAB | Age: 68
Setting detail: THERAPIES SERIES
Discharge: HOME | End: 2018-12-20
Payer: MEDICARE

## 2019-01-03 ENCOUNTER — TELEPHONE (OUTPATIENT)
Dept: CARDIOLOGY CLINIC | Age: 69
End: 2019-01-03

## 2019-01-25 ENCOUNTER — NURSE ONLY (OUTPATIENT)
Dept: NON INVASIVE DIAGNOSTICS | Age: 69
End: 2019-01-25
Payer: MEDICARE

## 2019-01-25 DIAGNOSIS — I49.5 SICK SINUS SYNDROME (HCC): ICD-10-CM

## 2019-01-25 DIAGNOSIS — I48.19 PERSISTENT ATRIAL FIBRILLATION (HCC): ICD-10-CM

## 2019-01-25 DIAGNOSIS — Z95.0 PACEMAKER: Primary | ICD-10-CM

## 2019-01-25 PROCEDURE — 93296 REM INTERROG EVL PM/IDS: CPT | Performed by: INTERNAL MEDICINE

## 2019-01-25 PROCEDURE — 93294 REM INTERROG EVL PM/LDLS PM: CPT | Performed by: INTERNAL MEDICINE

## 2019-01-28 ENCOUNTER — TELEPHONE (OUTPATIENT)
Dept: NON INVASIVE DIAGNOSTICS | Age: 69
End: 2019-01-28

## 2019-03-07 ENCOUNTER — APPOINTMENT (OUTPATIENT)
Dept: GENERAL RADIOLOGY | Age: 69
End: 2019-03-07
Payer: MEDICARE

## 2019-03-07 ENCOUNTER — HOSPITAL ENCOUNTER (EMERGENCY)
Age: 69
Discharge: HOME | End: 2019-03-07
Attending: EMERGENCY MEDICINE
Payer: MEDICARE

## 2019-03-07 VITALS
HEART RATE: 88 BPM | RESPIRATION RATE: 18 BRPM | DIASTOLIC BLOOD PRESSURE: 56 MMHG | HEIGHT: 67 IN | OXYGEN SATURATION: 96 % | TEMPERATURE: 98.7 F | BODY MASS INDEX: 28.72 KG/M2 | SYSTOLIC BLOOD PRESSURE: 114 MMHG | WEIGHT: 183 LBS

## 2019-03-07 DIAGNOSIS — N30.00 ACUTE CYSTITIS WITHOUT HEMATURIA: Primary | ICD-10-CM

## 2019-03-07 DIAGNOSIS — R50.9 FEVER, UNSPECIFIED FEVER CAUSE: ICD-10-CM

## 2019-03-07 LAB
ACANTHOCYTES: ABNORMAL
ALBUMIN SERPL-MCNC: 4.4 G/DL (ref 3.5–5.2)
ALP BLD-CCNC: 125 U/L (ref 35–104)
ALT SERPL-CCNC: 30 U/L (ref 0–32)
AMORPHOUS: ABNORMAL
ANION GAP SERPL CALCULATED.3IONS-SCNC: 15 MMOL/L (ref 7–16)
AST SERPL-CCNC: 71 U/L (ref 0–31)
BACTERIA: ABNORMAL /HPF
BASOPHILS ABSOLUTE: 0.05 E9/L (ref 0–0.2)
BASOPHILS RELATIVE PERCENT: 0.3 % (ref 0–2)
BILIRUB SERPL-MCNC: 0.4 MG/DL (ref 0–1.2)
BILIRUBIN URINE: NEGATIVE
BLOOD, URINE: ABNORMAL
BUN BLDV-MCNC: 15 MG/DL (ref 8–23)
CALCIUM SERPL-MCNC: 8.7 MG/DL (ref 8.6–10.2)
CASTS: ABNORMAL /LPF
CHLORIDE BLD-SCNC: 97 MMOL/L (ref 98–107)
CLARITY: CLEAR
CO2: 19 MMOL/L (ref 22–29)
COLOR: YELLOW
CREAT SERPL-MCNC: 1 MG/DL (ref 0.5–1)
EOSINOPHILS ABSOLUTE: 0 E9/L (ref 0.05–0.5)
EOSINOPHILS RELATIVE PERCENT: 0 % (ref 0–6)
EPITHELIAL CELLS, UA: ABNORMAL /HPF
GFR AFRICAN AMERICAN: >60
GFR NON-AFRICAN AMERICAN: 55 ML/MIN/1.73
GLUCOSE BLD-MCNC: 101 MG/DL (ref 74–99)
GLUCOSE URINE: NEGATIVE MG/DL
HCT VFR BLD CALC: 39.1 % (ref 34–48)
HEMOGLOBIN: 12.4 G/DL (ref 11.5–15.5)
IMMATURE GRANULOCYTES #: 0.08 E9/L
IMMATURE GRANULOCYTES %: 0.6 % (ref 0–5)
INFLUENZA A BY PCR: NOT DETECTED
INFLUENZA B BY PCR: NOT DETECTED
KETONES, URINE: ABNORMAL MG/DL
LACTIC ACID: 1.6 MMOL/L (ref 0.5–2.2)
LEUKOCYTE ESTERASE, URINE: ABNORMAL
LYMPHOCYTES ABSOLUTE: 2.16 E9/L (ref 1.5–4)
LYMPHOCYTES RELATIVE PERCENT: 15 % (ref 20–42)
MCH RBC QN AUTO: 31.2 PG (ref 26–35)
MCHC RBC AUTO-ENTMCNC: 31.7 % (ref 32–34.5)
MCV RBC AUTO: 98.5 FL (ref 80–99.9)
MONOCYTES ABSOLUTE: 1.84 E9/L (ref 0.1–0.95)
MONOCYTES RELATIVE PERCENT: 12.8 % (ref 2–12)
NEUTROPHILS ABSOLUTE: 10.23 E9/L (ref 1.8–7.3)
NEUTROPHILS RELATIVE PERCENT: 71.3 % (ref 43–80)
NITRITE, URINE: POSITIVE
PDW BLD-RTO: 13.7 FL (ref 11.5–15)
PH UA: 5.5 (ref 5–9)
PLATELET # BLD: 294 E9/L (ref 130–450)
PMV BLD AUTO: 9.6 FL (ref 7–12)
POIKILOCYTES: ABNORMAL
POTASSIUM SERPL-SCNC: 4.8 MMOL/L (ref 3.5–5)
PROTEIN UA: 30 MG/DL
RBC # BLD: 3.97 E12/L (ref 3.5–5.5)
RBC UA: ABNORMAL /HPF (ref 0–2)
SCHISTOCYTES: ABNORMAL
SODIUM BLD-SCNC: 131 MMOL/L (ref 132–146)
SPECIFIC GRAVITY UA: 1.02 (ref 1–1.03)
TOTAL PROTEIN: 8.5 G/DL (ref 6.4–8.3)
TROPONIN: <0.01 NG/ML (ref 0–0.03)
UROBILINOGEN, URINE: 0.2 E.U./DL
WBC # BLD: 14.4 E9/L (ref 4.5–11.5)
WBC UA: >20 /HPF (ref 0–5)

## 2019-03-07 PROCEDURE — 80053 COMPREHEN METABOLIC PANEL: CPT

## 2019-03-07 PROCEDURE — 99284 EMERGENCY DEPT VISIT MOD MDM: CPT

## 2019-03-07 PROCEDURE — 71045 X-RAY EXAM CHEST 1 VIEW: CPT

## 2019-03-07 PROCEDURE — 87088 URINE BACTERIA CULTURE: CPT

## 2019-03-07 PROCEDURE — 87798 DETECT AGENT NOS DNA AMP: CPT

## 2019-03-07 PROCEDURE — 87040 BLOOD CULTURE FOR BACTERIA: CPT

## 2019-03-07 PROCEDURE — 85025 COMPLETE CBC W/AUTO DIFF WBC: CPT

## 2019-03-07 PROCEDURE — 83605 ASSAY OF LACTIC ACID: CPT

## 2019-03-07 PROCEDURE — 36415 COLL VENOUS BLD VENIPUNCTURE: CPT

## 2019-03-07 PROCEDURE — 6370000000 HC RX 637 (ALT 250 FOR IP): Performed by: PHYSICIAN ASSISTANT

## 2019-03-07 PROCEDURE — 87186 SC STD MICRODIL/AGAR DIL: CPT

## 2019-03-07 PROCEDURE — 87502 INFLUENZA DNA AMP PROBE: CPT

## 2019-03-07 PROCEDURE — 84484 ASSAY OF TROPONIN QUANT: CPT

## 2019-03-07 PROCEDURE — 87486 CHLMYD PNEUM DNA AMP PROBE: CPT

## 2019-03-07 PROCEDURE — 96365 THER/PROPH/DIAG IV INF INIT: CPT

## 2019-03-07 PROCEDURE — 87077 CULTURE AEROBIC IDENTIFY: CPT

## 2019-03-07 PROCEDURE — 6360000002 HC RX W HCPCS: Performed by: EMERGENCY MEDICINE

## 2019-03-07 PROCEDURE — 2580000003 HC RX 258: Performed by: EMERGENCY MEDICINE

## 2019-03-07 PROCEDURE — 87633 RESP VIRUS 12-25 TARGETS: CPT

## 2019-03-07 PROCEDURE — 81001 URINALYSIS AUTO W/SCOPE: CPT

## 2019-03-07 PROCEDURE — 87581 M.PNEUMON DNA AMP PROBE: CPT

## 2019-03-07 RX ORDER — CEFDINIR 300 MG/1
300 CAPSULE ORAL 2 TIMES DAILY
Qty: 14 CAPSULE | Refills: 0 | Status: SHIPPED | OUTPATIENT
Start: 2019-03-07 | End: 2019-03-13

## 2019-03-07 RX ORDER — ACETAMINOPHEN 500 MG
1000 TABLET ORAL ONCE
Status: COMPLETED | OUTPATIENT
Start: 2019-03-07 | End: 2019-03-07

## 2019-03-07 RX ORDER — SODIUM CHLORIDE 9 MG/ML
INJECTION, SOLUTION INTRAVENOUS ONCE
Status: COMPLETED | OUTPATIENT
Start: 2019-03-07 | End: 2019-03-07

## 2019-03-07 RX ADMIN — SODIUM CHLORIDE: 9 INJECTION, SOLUTION INTRAVENOUS at 20:39

## 2019-03-07 RX ADMIN — CEFTRIAXONE 1 G: 1 INJECTION, POWDER, FOR SOLUTION INTRAMUSCULAR; INTRAVENOUS at 22:41

## 2019-03-07 RX ADMIN — ACETAMINOPHEN 1000 MG: 500 TABLET ORAL at 20:08

## 2019-03-07 ASSESSMENT — PAIN DESCRIPTION - PAIN TYPE: TYPE: ACUTE PAIN

## 2019-03-07 ASSESSMENT — PAIN SCALES - GENERAL: PAINLEVEL_OUTOF10: 5

## 2019-03-07 ASSESSMENT — PAIN DESCRIPTION - LOCATION: LOCATION: GENERALIZED

## 2019-03-08 LAB
FILM ARRAY ADENOVIRUS: NORMAL
FILM ARRAY BORDETELLA PERTUSSIS: NORMAL
FILM ARRAY CHLAMYDOPHILIA PNEUMONIAE: NORMAL
FILM ARRAY CORONAVIRUS 229E: NORMAL
FILM ARRAY CORONAVIRUS HKU1: NORMAL
FILM ARRAY CORONAVIRUS NL63: NORMAL
FILM ARRAY CORONAVIRUS OC43: NORMAL
FILM ARRAY INFLUENZA A VIRUS 09H1: NORMAL
FILM ARRAY INFLUENZA A VIRUS H1: NORMAL
FILM ARRAY INFLUENZA A VIRUS H3: NORMAL
FILM ARRAY INFLUENZA A VIRUS: NORMAL
FILM ARRAY INFLUENZA B: NORMAL
FILM ARRAY METAPNEUMOVIRUS: NORMAL
FILM ARRAY MYCOPLASMA PNEUMONIAE: NORMAL
FILM ARRAY PARAINFLUENZA VIRUS 1: NORMAL
FILM ARRAY PARAINFLUENZA VIRUS 2: NORMAL
FILM ARRAY PARAINFLUENZA VIRUS 3: NORMAL
FILM ARRAY PARAINFLUENZA VIRUS 4: NORMAL
FILM ARRAY RESPIRATORY SYNCITIAL VIRUS: NORMAL
FILM ARRAY RHINOVIRUS/ENTEROVIRUS: NORMAL

## 2019-03-10 LAB
ORGANISM: ABNORMAL
URINE CULTURE, ROUTINE: ABNORMAL
URINE CULTURE, ROUTINE: ABNORMAL

## 2019-03-13 ENCOUNTER — OFFICE VISIT (OUTPATIENT)
Dept: CARDIOLOGY CLINIC | Age: 69
End: 2019-03-13
Payer: MEDICARE

## 2019-03-13 VITALS
DIASTOLIC BLOOD PRESSURE: 74 MMHG | HEART RATE: 70 BPM | SYSTOLIC BLOOD PRESSURE: 118 MMHG | RESPIRATION RATE: 18 BRPM | WEIGHT: 189 LBS | HEIGHT: 67 IN | BODY MASS INDEX: 29.66 KG/M2

## 2019-03-13 DIAGNOSIS — I48.91 ATRIAL FIBRILLATION, UNSPECIFIED TYPE (HCC): Primary | ICD-10-CM

## 2019-03-13 LAB
BLOOD CULTURE, ROUTINE: NORMAL
CULTURE, BLOOD 2: NORMAL

## 2019-03-13 PROCEDURE — 99214 OFFICE O/P EST MOD 30 MIN: CPT | Performed by: INTERNAL MEDICINE

## 2019-03-13 PROCEDURE — G8427 DOCREV CUR MEDS BY ELIG CLIN: HCPCS | Performed by: INTERNAL MEDICINE

## 2019-03-13 PROCEDURE — 1090F PRES/ABSN URINE INCON ASSESS: CPT | Performed by: INTERNAL MEDICINE

## 2019-03-13 PROCEDURE — 3017F COLORECTAL CA SCREEN DOC REV: CPT | Performed by: INTERNAL MEDICINE

## 2019-03-13 PROCEDURE — G8400 PT W/DXA NO RESULTS DOC: HCPCS | Performed by: INTERNAL MEDICINE

## 2019-03-13 PROCEDURE — 4040F PNEUMOC VAC/ADMIN/RCVD: CPT | Performed by: INTERNAL MEDICINE

## 2019-03-13 PROCEDURE — 1036F TOBACCO NON-USER: CPT | Performed by: INTERNAL MEDICINE

## 2019-03-13 PROCEDURE — G8417 CALC BMI ABV UP PARAM F/U: HCPCS | Performed by: INTERNAL MEDICINE

## 2019-03-13 PROCEDURE — 1123F ACP DISCUSS/DSCN MKR DOCD: CPT | Performed by: INTERNAL MEDICINE

## 2019-03-13 PROCEDURE — 93000 ELECTROCARDIOGRAM COMPLETE: CPT | Performed by: INTERNAL MEDICINE

## 2019-03-13 PROCEDURE — G8484 FLU IMMUNIZE NO ADMIN: HCPCS | Performed by: INTERNAL MEDICINE

## 2019-03-13 NOTE — PROGRESS NOTES
CHIEF COMPLAINT: PAF/ VHD/Pacer    HISTORY OF PRESENT ILLNESS: Patient is a 80 y.o. female seen at the request of Sonia Zayas MD, MD.      [facility] surgery on 11/6/2018 by Dr. Juana Mario. She had a mitral valve repair utilizing a 28 mm Wiggins Physio Complete ring. A tricuspid valve repair utilizing a 28 mm Medtronic Contour 3D band, right and left cryo and bipolar MAZE procedures, and a left atrial exclusion utilizing a 35 mm Atri-Clip. Preoperative catheterization showed a 20% LAD stenosis and a 20% right coronary artery stenosis and therefore, no bypass was necessary. No CP or SOB. Problem list includes:   1. Myelodysplastic syndrome followed by a hematologist at [facility] in [facility] has been observed thus far.    2. Sick sinus syndrome with atrial fibrillation and slow ventricular response.    3. Echocardiogram, 03/16/2018, normal left ventricular size, wall thickness and wall motion with ejection fraction 65%.  Patient has aortic sclerosis and moderate mitral insufficiency.  Right ventricular systolic pressure is 35 mmHg and normal.    4. Pharmacologic myocardial perfusion study, 03/23/2028. Ejection fraction 83%, no ischemia, low risk study.    5. [id number] score of 2.    6. DDD pacemaker placed by Dr. Josephine Matute, 07/13/2018. 7. Transesophageal echocardiogram, 09/05/2018, demonstrated a severely dilated left atrium with normal left ventricular size and overall function. Severe mitral insufficiency was present without stenosis. Aortic valve was tri leaflet and functioned normally. 8. Cardiac catheterization, Dr. Shelby Holliday, 09/28/2018, normal left main, 20% LAD stenosis, normal circumflex, 20% mid right coronary artery stenosis. 9. Mitral and tricuspid repairs planned, but surgery canceled in 10/2018 because of urinary tract infection. 10. Heart surgery, 11/6/2018 by Dr. Greg Braun with mitral valve repair utilizing 28 mm Wiggins Physio complete ring.   Tricuspid valve repair utilizing a 28 mm Medtronic Contour 3-D band, right and left cryo and bipolar MAZE procedure and left atrial appendage exclusion utilizing a 35 mm Atri-Clip. Past Medical History:   Diagnosis Date    Anemia     Anxiety     Atrial fibrillation (HCC)     Bradycardia     Chest pain     Depression     Dyspnea     GERD (gastroesophageal reflux disease)     Lung nodule     [facility]     Symptomatic bradycardia     Transaminitis        Patient Active Problem List   Diagnosis    Sick sinus syndrome (HCC)    Persistent atrial fibrillation (HCC)    PAF [facility]oxysmal [facility] fibrillation) [facility])    Symptomatic bradycardia    Near syncope    Myelodysplastic syndrome (HCC)    Stroke-like symptom    History of unsteady gait    Pacemaker    Non-rheumatic mitral regurgitation    SOB (shortness of breath)    Severe mitral regurgitation    Respiratory insufficiency    Postoperative hypotension    Acute post-operative pain    History of mitral valve repair    S/P tricuspid valve repair       Allergies   Allergen Reactions    Iv Dye [Iodides] Swelling    Pcn [Penicillins] Swelling    Adhesive Tape Rash       Current Outpatient Medications   Medication Sig Dispense Refill    aspirin 81 MG EC tablet Take 1 tablet by mouth daily 30 tablet 1    apixaban (ELIQUIS) 5 MG TABS tablet Take 1 tablet by mouth 2 times daily 60 tablet 1    LORazepam (ATIVAN) 1 MG tablet Take 1 mg by mouth every morning. [place] cyanocobalamin 1000 MCG/ML injection Inject 1,000 mcg into the muscle every 30 days       FLUoxetine (PROZAC) 40 MG capsule Take 40 mg by mouth every morning       venlafaxine (EFFEXOR XR) 75 MG extended release capsule Take 75 mg by mouth every morning       omeprazole (PRILOSEC) 40 MG delayed release capsule Take 40 mg by mouth daily as needed       cefdinir (OMNICEF) 300 MG capsule Take 1 capsule by mouth 2 times daily for 7 days 14 capsule 0    metoprolol tartrate (LOPRESSOR) 25 MG tablet Take 1 tablet by mouth 2 times daily 60 tablet 1     No current facility-administered medications for this visit. Social History     Socioeconomic History    Marital status:      Spouse name: Not on file    Number of children: Not on file    Years of education: Not on file    Highest education level: Not on file   Occupational History    Not on file   Social Needs    Financial resource strain: Not on file    Food insecurity:     Worry: Not on file     Inability: Not on file    Transportation needs:     Medical: Not on file     Non-medical: Not on file   Tobacco Use    Smoking status: Former Smoker     Packs/day: 0.25     Years: 30.00     Pack years: 7.50     Last attempt to quit: 1983     Years since quittin.6    Smokeless tobacco: Never Used   Substance and Sexual Activity    Alcohol use: Yes     Comment: rare wine    Drug use: No    Sexual activity: Not on file   Lifestyle    Physical activity:     Days per week: Not on file     Minutes per session: Not on file    Stress: Not on file   Relationships    Social connections:     Talks on phone: Not on file     Gets together: Not on file     Attends Worship service: Not on file     Active member of club or organization: Not on file     Attends meetings of clubs or organizations: Not on file     Relationship status: Not on file    Intimate partner violence:     Fear of current or ex partner: Not on file     Emotionally abused: Not on file     Physically abused: Not on file     Forced sexual activity: Not on file   Other Topics Concern    Not on file   Social History Narrative    Not on file       History reviewed. No pertinent family history. Review of Systems:  Heart: as above   Lungs: as above   Eyes: denies changes in vision or discharge. Ears: denies changes in hearing or pain. Nose: denies epistaxis or masses   Throat: denies sore throat or trouble swallowing.    Neuro: denies numbness, tingling, tremors. Skin: denies rashes or itching. : denies hematuria, dysuria   GI: denies vomiting, diarrhea   Psych: denies mood changed, anxiety, depression. All other systems negative. Physical Exam   /74   Pulse 70   Resp 18   Ht 5' 7\" (1.702 m)   Wt 189 lb (85.7 kg)   BMI 29.60 kg/m²   Constitutional: Oriented to person, place, and time. Well-developed and well-nourished. No distress. Head: Normocephalic and atraumatic. Eyes: EOM are normal. Pupils are equal, round, and reactive to light. Neck: Normal range of motion. Neck supple. No hepatojugular reflux and no JVD present. Carotid bruit is not present. No tracheal deviation present. No thyromegaly present. Cardiovascular: Normal rate, regular rhythm, FABRICE 2/6  Pulmonary/Chest: Effort normal and breath sounds normal. No respiratory distress. No wheezes. No rales. No tenderness. Abdominal: Soft. Bowel sounds are normal. No distension and no mass. No tenderness. No rebound and no guarding. Musculoskeletal: Normal range of motion. No edema and no tenderness. Lymphadenopathy:   No cervical adenopathy. No groin adenopathy. Neurological: Alert and oriented to person, place, and time. Skin: Skin is warm and dry. No rash noted. Not diaphoretic. No erythema. Psychiatric: Normal mood and affect. Behavior is normal.     EKG:  normal sinus rhythm, nonspecific ST and T waves changes, V paced.     ASSESSMENT AND PLAN:  Patient Active Problem List   Diagnosis    Sick sinus syndrome (HCC)    Persistent atrial fibrillation (HCC)    PAF (paroxysmal atrial fibrillation) (HCC)    Symptomatic bradycardia    Near syncope    Myelodysplastic syndrome (HCC)    Stroke-like symptom    History of unsteady gait    Pacemaker    Non-rheumatic mitral regurgitation    SOB (shortness of breath)    Severe mitral regurgitation    Respiratory insufficiency    Postoperative hypotension    Acute post-operative pain    History of mitral valve repair    S/P tricuspid valve repair     1. Hx of MV repair/TV repair/MAZE:     2. PAF: No Afib since surgery as evidenced by pacer. Stop Eliquis. 3. CAD: Mild by cath 9/28/18. 4. Pacer: Per EP. 5. Myelodysplastic syndrome: Followed by a hematologist at [facility] in [address] [place] [address], D.O.   Cardiologist  Cardiology, [address] [place] [address]

## 2019-03-15 LAB
EKG ATRIAL RATE: 90 BPM
EKG P AXIS: -34 DEGREES
EKG P-R INTERVAL: 208 MS
EKG Q-T INTERVAL: 450 MS
EKG QRS DURATION: 154 MS
EKG QTC CALCULATION (BAZETT): 550 MS
EKG R AXIS: -53 DEGREES
EKG T AXIS: 118 DEGREES
EKG VENTRICULAR RATE: 90 BPM

## 2019-04-01 ENCOUNTER — TELEPHONE (OUTPATIENT)
Dept: NON INVASIVE DIAGNOSTICS | Age: 69
End: 2019-04-01

## 2019-04-01 NOTE — TELEPHONE ENCOUNTER
Patient called today asking if we received a remote transmission that she sent yesterday, 3-31-19. Told her we did not. Had patient resend a transmission today. Both today's and yesterday's remotes then came over. Patient states she sent a remote because for the last 4 nights, she feels as if her heart is pounding and she gets SOB. She is also concerned because Dr. Jamar Dc recently took her off of Eliquis and she wants to make sure she is not having a lot of AF. Reviewed both remotes. Presenting: AS- @ 98 ppm.  3 ATR episodes. March 8th, 19th, and 20th. Longest 1 minute 22 seconds. Reviewed the results with the patient and let her know there were no episodes from the last 4 days. Patient states she has a lot going on in her life and is relieved that her remote showed no significant events. Instructed to call us with any further questions or concerns.    Randolph Rodgers RN  [facility] [address]

## 2019-05-01 ENCOUNTER — NURSE ONLY (OUTPATIENT)
Dept: NON INVASIVE DIAGNOSTICS | Age: 69
End: 2019-05-01
Payer: MEDICARE

## 2019-05-01 DIAGNOSIS — Z95.0 PACEMAKER: Primary | ICD-10-CM

## 2019-05-01 DIAGNOSIS — I49.5 SICK SINUS SYNDROME (HCC): ICD-10-CM

## 2019-05-01 DIAGNOSIS — I48.19 PERSISTENT ATRIAL FIBRILLATION (HCC): ICD-10-CM

## 2019-05-01 PROCEDURE — 93294 REM INTERROG EVL PM/LDLS PM: CPT | Performed by: INTERNAL MEDICINE

## 2019-05-01 PROCEDURE — 93296 REM INTERROG EVL PM/IDS: CPT | Performed by: INTERNAL MEDICINE

## 2019-05-08 ENCOUNTER — TELEPHONE (OUTPATIENT)
Dept: NON INVASIVE DIAGNOSTICS | Age: 69
End: 2019-05-08

## 2019-05-08 NOTE — TELEPHONE ENCOUNTER
We have received your remote transmission. Our staff will contact you if there is anything that needs to be discussed. Your next appointment is August 1, 2019 for remote transmission. Patient verbalized understanding.

## 2019-07-01 ENCOUNTER — TELEPHONE (OUTPATIENT)
Dept: CARDIOLOGY CLINIC | Age: 69
End: 2019-07-01

## 2019-07-31 ENCOUNTER — NURSE ONLY (OUTPATIENT)
Dept: NON INVASIVE DIAGNOSTICS | Age: 69
End: 2019-07-31
Payer: MEDICARE

## 2019-07-31 DIAGNOSIS — I49.5 SICK SINUS SYNDROME (HCC): ICD-10-CM

## 2019-07-31 DIAGNOSIS — I48.19 PERSISTENT ATRIAL FIBRILLATION (HCC): ICD-10-CM

## 2019-07-31 DIAGNOSIS — Z95.0 PACEMAKER: Primary | ICD-10-CM

## 2019-07-31 PROCEDURE — 93294 REM INTERROG EVL PM/LDLS PM: CPT | Performed by: INTERNAL MEDICINE

## 2019-07-31 PROCEDURE — 93296 REM INTERROG EVL PM/IDS: CPT | Performed by: INTERNAL MEDICINE

## 2019-07-31 NOTE — PROGRESS NOTES
Cardiac Electrophysiology Outpatient Progress Note    Coretha Moritz  1950  Date of Service: 8/6/19   Referring Provider/PCP: Adan Batista DO  Cardiologist: Christina Garay DO  Electrophysiologist: Edis Hernandez DO    Patient Active Problem List    Diagnosis Date Noted    Severe mitral regurgitation 11/06/2018    Respiratory insufficiency 11/06/2018    Postoperative hypotension 11/06/2018    Acute post-operative pain 11/06/2018    History of mitral valve repair     S/P tricuspid valve repair     SOB (shortness of breath)     Non-rheumatic mitral regurgitation     Pacemaker 07/26/2018     Overview Note:     A. Veezeon      Myelodysplastic syndrome ([facility] [address] 07/13/2018    Stroke-like symptom 07/13/2018    History of unsteady gait 07/13/2018    Persistent atrial fibrillation ([facility] [address] [address] 07/12/2018     Overview Note:     A. Xarelto      PAF [facility]ox[facility]mal [facility] fibrillation) ([facility] 07/12/2018    Symptomatic bradycardia 07/12/2018    Near syncope 07/12/2018    Sick sinus syndrome ([facility]) 06/19/2018       Current Outpatient Medications   Medication Sig Dispense Refill    aspirin 81 MG EC tablet Take 1 tablet by mouth daily 30 tablet 1    LORazepam (ATIVAN) 1 MG tablet Take 1 mg by mouth every morning. Jazmine Fernandez cyanocobalamin 1000 MCG/ML injection Inject 1,000 mcg into the muscle every 30 days       FLUoxetine (PROZAC) 40 MG capsule Take 40 mg by mouth every morning       venlafaxine (EFFEXOR XR) 75 MG extended release capsule Take 75 mg by mouth every morning       omeprazole (PRILOSEC) 40 MG delayed release capsule Take 40 mg by mouth daily as needed        No current facility-administered medications for this visit.          Allergies   Allergen Reactions    Iv Dye [Iodides] Swelling    Pcn [Penicillins] Swelling    Adhesive Tape Rash       SUBJECTIVE: Coretha Moritz presents to the office today for the management of these Electrophysiology conditions: symptomatic persistent AF, Xarelto [facility], symptomatic bradycardia, pacemaker in situ, s/p PRUDENCE/CV, new VHD. Since her last office visit Ms. Xander Cook is s/p MV repair, TV repair, MAZE procedure and Atri-Clip on 11/6/18 with Dr. Tamara Villeda. Since her operative procedure, her Eliquis was discontinued after her discussion with Dr. Kamala Lundberg. On device interrogation, as noted below, she has had shorte episodes of AFl; the longest episode was 7 minutes. She offers no complaints from a device point of view. She is enrolled in [employer] remote monitoring. She denies angina, syncope, orthopnea and PND. Review of Systems   All other systems reviewed and are negative. PHYSICAL EXAM:  Vitals:    08/06/19 0943   BP: 122/74   Pulse: 81   Resp: 20   Weight: 187 lb (84.8 kg)   Height: 5' 7\" (1.702 m)     Constitutional: Oriented to person, place, and time. Well-developed and cooperative. Head: Normocephalic and atraumatic. Eyes: Conjunctivae are normal.   Neck: No hepatojugular reflux and no JVD present. Cardiovascular: S1 normal, S2 normal and intact distal pulses. Irregular regular rhythm. PMI is not displaced. Pulmonary/Chest: Effort normal and breath sounds normal. No respiratory distress. Abdominal: Soft. Normal appearance and bowel sounds are normal. No tenderness. Musculoskeletal: Normal range of motion of all extremities, no muscle weakness. Neurological: Alert and oriented to person, place, and time. Gait normal.   Skin: Skin is warm and dry. No bruising, no ecchymosis and no rash noted. Extremity: No clubbing or cyanosis. No edema. Psychiatric: Normal mood and affect. Thought content normal.   Pacemaker site: stable, well healed, no evidence of erosion    Pertinent Cardiac Testing    CARDIAC CATHETERIZATION     : Wendy Bishop        Date of procedure: 9/28/2018        Indication:  1. MR, TR  2. AUC indication: 70  3.  AUC score: 7     Procedure:Coronary angiography     Anesthesia: local and moderate conscious sedation  Time      Echocardiogram               Patient Location: Inpatient               Blood Pressure: 129/51 mmHg       HR: 72 bpm       Rhythm: Atrial Fibrillation               Other Information        Study Quality: Adequate               Indications       DX: NEW ONSET AFIB               2D Dimensions           LVEF(%):  60.0 (>50%)           LVOT Diam:  2.0 (1.8-2.4cm)            Ascending Aorta:  3.5 cm           IVC:  2.0 cm       Left Atrium:  4.2 (2.7-4.0cm) TAPSE:  2.2 (<1.7)       Aortic Root:  2.5 cm  Hoff's LVEF:  60.0 %       LV Single Plane 4CH:  78.5 %                M-Mode Dimensions       RVDd:  4.1 (2.1-3.2cm)        IVSd:  1.1 (0.7-1.1cm)        LVDd:  4.1 (4.0-5.6cm) Aortic Cusp Exc.:  1.7 (1.5-2.0cm)       PWd:  1.2 (0.7-1.1cm)           FS(%):  32.7 %       LVDs:  2.8 (2.0-3.8cm) ESV (Teich):  28.5 ml       LVEF(%):  60.0 (>50%)                Volumes       Left Atrial Volume (Systole)           PAGE 1               Signed Report                     (CONTINUED)                                            Name: JASIEL GUARDADO                                            Phys: AURELIANO CARRILLO DO                                            : 1950 Age: 78      Sex:  F                                            Acct: [de-identified] Loc: 405 2                                               Exam Date: 2018 Status: ADM IN                                            Radiology No: [id number]                                            Unit No: [id number]                    EXAM#     TYPE/EXAM                       RESULT                        [id number] ECHO/ECHOCARDIOGRAM COMPLETE    SEE REPORT                           <Continued>          Single Plane 4CH:  78.2 mL Single Plane 2CH:  74.2 mL       Biplane LA Volume:  76.2 mL LA ESV Index:  37.7 mL/m2               Aortic Valve       AoV Peak Ron.:  1.8 m/s        AO Peak Gr.:  12.9 mmHg LV Max P.4 mmHg       AO Mean Gr.:  6.9 mmHg LV Mean P.4 7/13/18  Mode DDDR 60/130 ppm  P wave: 1.3 mV  Impedance: 619 ohms   Threshold: 1.2 V @ 0.4 ms  RV R wave: 6.9 mV  Impedance: 656 ohms   Threshold: 1.0 V @ 0.4 ms  Pacing: A: 31%  RV: 91%   Battery Voltage/Longevity: 10 years. Arrhythmias: 1. PAFL--longest 7 minutes. Reprogramming included: None. Overall device function is normal  All device programmable settings were evaluated per above and in the scanned document, along with iterative adjustments (capture thresholds) to assess and select the most appropriate final programming to provide for consistent delivery of the appropriate therapy and to verify function of the device. I have independently reviewed rhythm strips per above    I have personally reviewed the laboratory, cardiac diagnostic and radiographic testing as outlined above:      Impression:    1. Persistent atrial fibrillation  - SVR  - diagnosed 3/21/18  - [id number] score: 2 (age, sex)  - Holter monitor 7/5/18: AF burden 99.4%  - no history of AAD therapy, CV or catheter ablation  - s/p PRUDENCE/CV 9/5/18  - s/p MAZE procedure 11/6/18 with Dr. Emma Bell  - now off [address] [place] [address]  - noted up to 7 minutes of PAFl    2. Symptomatic bradycardia/AF with SVR/SSS  - HRs 30s - 40s bpm with associated GU, SOB, and dizziness. - on no AV paty blocking agents, no reversible etiology  - echocardiogram 3/16/18: LVEF 65-70% (see above)  - stress test 3/23/18: no ischemia    3. Dual chamber PPM in situ  - [facility] Accolade MRI, model #: [id number], serial #: [id number]  - DOI 7/13/18     4. Myelodysplastic syndrome  - followed by hematologist at [facility] in [place]  No results for input(s): WBC, HGB, HCT, MCV, PLT in the last 720 hours.     5. H/O Anxiety/Depression  - Prozac/Effexor  - c/o dizziness/lightheadedness      6.  HLD  - Lipitor  No results found for: CHOL  No results found for: TRIG  No results found for: HDL  No results found for: LDLCHOLESTEROL, LDLCALC  No results found for: LABVLDL, VLDL  No results found for:

## 2019-08-02 ENCOUNTER — TELEPHONE (OUTPATIENT)
Dept: NON INVASIVE DIAGNOSTICS | Age: 69
End: 2019-08-02

## 2019-08-06 ENCOUNTER — OFFICE VISIT (OUTPATIENT)
Dept: NON INVASIVE DIAGNOSTICS | Age: 69
End: 2019-08-06
Payer: MEDICARE

## 2019-08-06 VITALS
WEIGHT: 187 LBS | RESPIRATION RATE: 20 BRPM | BODY MASS INDEX: 29.35 KG/M2 | HEART RATE: 81 BPM | SYSTOLIC BLOOD PRESSURE: 122 MMHG | HEIGHT: 67 IN | DIASTOLIC BLOOD PRESSURE: 74 MMHG

## 2019-08-06 DIAGNOSIS — Z95.0 PACEMAKER: Primary | ICD-10-CM

## 2019-08-06 PROCEDURE — G8427 DOCREV CUR MEDS BY ELIG CLIN: HCPCS | Performed by: INTERNAL MEDICINE

## 2019-08-06 PROCEDURE — 1123F ACP DISCUSS/DSCN MKR DOCD: CPT | Performed by: INTERNAL MEDICINE

## 2019-08-06 PROCEDURE — G8417 CALC BMI ABV UP PARAM F/U: HCPCS | Performed by: INTERNAL MEDICINE

## 2019-08-06 PROCEDURE — 1090F PRES/ABSN URINE INCON ASSESS: CPT | Performed by: INTERNAL MEDICINE

## 2019-08-06 PROCEDURE — G8400 PT W/DXA NO RESULTS DOC: HCPCS | Performed by: INTERNAL MEDICINE

## 2019-08-06 PROCEDURE — 4040F PNEUMOC VAC/ADMIN/RCVD: CPT | Performed by: INTERNAL MEDICINE

## 2019-08-06 PROCEDURE — 1036F TOBACCO NON-USER: CPT | Performed by: INTERNAL MEDICINE

## 2019-08-06 PROCEDURE — 3017F COLORECTAL CA SCREEN DOC REV: CPT | Performed by: INTERNAL MEDICINE

## 2019-08-06 PROCEDURE — 93280 PM DEVICE PROGR EVAL DUAL: CPT | Performed by: INTERNAL MEDICINE

## 2019-08-06 PROCEDURE — 99214 OFFICE O/P EST MOD 30 MIN: CPT | Performed by: INTERNAL MEDICINE

## 2019-10-30 ENCOUNTER — NURSE ONLY (OUTPATIENT)
Dept: NON INVASIVE DIAGNOSTICS | Age: 69
End: 2019-10-30
Payer: MEDICARE

## 2019-10-30 DIAGNOSIS — I49.5 SICK SINUS SYNDROME (HCC): ICD-10-CM

## 2019-10-30 DIAGNOSIS — Z95.0 PACEMAKER: Primary | ICD-10-CM

## 2019-10-30 PROCEDURE — 93296 REM INTERROG EVL PM/IDS: CPT | Performed by: INTERNAL MEDICINE

## 2019-10-30 PROCEDURE — 93294 REM INTERROG EVL PM/LDLS PM: CPT | Performed by: INTERNAL MEDICINE

## 2019-11-01 ENCOUNTER — TELEPHONE (OUTPATIENT)
Dept: NON INVASIVE DIAGNOSTICS | Age: 69
End: 2019-11-01

## 2020-01-16 ENCOUNTER — OFFICE VISIT (OUTPATIENT)
Dept: CARDIOLOGY CLINIC | Age: 70
End: 2020-01-16
Payer: MEDICARE

## 2020-01-16 VITALS
DIASTOLIC BLOOD PRESSURE: 78 MMHG | RESPIRATION RATE: 18 BRPM | HEART RATE: 78 BPM | SYSTOLIC BLOOD PRESSURE: 128 MMHG | WEIGHT: 186.6 LBS | BODY MASS INDEX: 29.29 KG/M2 | HEIGHT: 67 IN

## 2020-01-16 PROCEDURE — 1036F TOBACCO NON-USER: CPT | Performed by: INTERNAL MEDICINE

## 2020-01-16 PROCEDURE — G8417 CALC BMI ABV UP PARAM F/U: HCPCS | Performed by: INTERNAL MEDICINE

## 2020-01-16 PROCEDURE — 3017F COLORECTAL CA SCREEN DOC REV: CPT | Performed by: INTERNAL MEDICINE

## 2020-01-16 PROCEDURE — G8428 CUR MEDS NOT DOCUMENT: HCPCS | Performed by: INTERNAL MEDICINE

## 2020-01-16 PROCEDURE — G8400 PT W/DXA NO RESULTS DOC: HCPCS | Performed by: INTERNAL MEDICINE

## 2020-01-16 PROCEDURE — 1090F PRES/ABSN URINE INCON ASSESS: CPT | Performed by: INTERNAL MEDICINE

## 2020-01-16 PROCEDURE — 93000 ELECTROCARDIOGRAM COMPLETE: CPT | Performed by: INTERNAL MEDICINE

## 2020-01-16 PROCEDURE — 99214 OFFICE O/P EST MOD 30 MIN: CPT | Performed by: INTERNAL MEDICINE

## 2020-01-16 PROCEDURE — 1123F ACP DISCUSS/DSCN MKR DOCD: CPT | Performed by: INTERNAL MEDICINE

## 2020-01-16 PROCEDURE — 4040F PNEUMOC VAC/ADMIN/RCVD: CPT | Performed by: INTERNAL MEDICINE

## 2020-01-16 PROCEDURE — G8484 FLU IMMUNIZE NO ADMIN: HCPCS | Performed by: INTERNAL MEDICINE

## 2020-01-16 RX ORDER — VENLAFAXINE 75 MG/1
75 TABLET ORAL DAILY
COMMUNITY
Start: 2020-01-15 | End: 2020-01-16 | Stop reason: SDUPTHER

## 2020-01-16 NOTE — PROGRESS NOTES
CHIEF COMPLAINT: PAF/ VHD/Pacer    HISTORY OF PRESENT ILLNESS: Patient is a 77 y.o. female seen at the request of Drew Schneider DO. Underwent surgery on 11/6/2018 by Dr. Ge Rosas. She had a mitral valve repair utilizing a 28 mm Wiggins Physio Complete ring. A tricuspid valve repair utilizing a 28 mm Medtronic Contour 3D band, right and left cryo and bipolar MAZE procedures, and a left atrial exclusion utilizing a 35 mm Atri-Clip. Preoperative catheterization showed a 20% LAD stenosis and a 20% right coronary artery stenosis and therefore, no bypass was necessary. No CP or SOB. Problem list includes:   1. Myelodysplastic syndrome followed by a hematologist at [facility] in [facility] has been observed thus far.    2. Sick sinus syndrome with atrial fibrillation and slow ventricular response.    3. Echocardiogram, 03/16/2018, normal left ventricular size, wall thickness and wall motion with ejection fraction 65%.  Patient has aortic sclerosis and moderate mitral insufficiency.  Right ventricular systolic pressure is 35 mmHg and normal.    4. Pharmacologic myocardial perfusion study, 03/23/2028. Ejection fraction 83%, no ischemia, low risk study.    5. [id number] score of 2.    6. DDD pacemaker placed by Dr. Dontae Olivier, 07/13/2018. 7. Transesophageal echocardiogram, 09/05/2018, demonstrated a severely dilated left atrium with normal left ventricular size and overall function. Severe mitral insufficiency was present without stenosis. Aortic valve was tri leaflet and functioned normally. 8. Cardiac catheterization, Dr. Demond Lamb, 09/28/2018, normal left main, 20% LAD stenosis, normal circumflex, 20% mid right coronary artery stenosis. 9. Mitral and tricuspid repairs planned, but surgery canceled in 10/2018 because of urinary tract infection. 10. Heart surgery, 11/6/2018 by Dr. Flaquita Law with mitral valve repair utilizing 28 mm Wiggins Physio complete ring.   Tricuspid valve History    Marital status:      Spouse name: Not on file    Number of children: Not on file    Years of education: Not on file    Highest education level: Not on file   Occupational History    Not on file   Social Needs    Financial resource strain: Not on file    Food insecurity:     Worry: Not on file     Inability: Not on file    Transportation needs:     Medical: Not on file     Non-medical: Not on file   Tobacco Use    Smoking status: Former Smoker     Packs/day: 0.25     Years: 30.00     Pack years: 7.50     Last attempt to quit: 1983     Years since quittin.5    Smokeless tobacco: Never Used   Substance and Sexual Activity    Alcohol use: Yes     Comment: rare wine    Drug use: No    Sexual activity: Not on file   Lifestyle    Physical activity:     Days per week: Not on file     Minutes per session: Not on file    Stress: Not on file   Relationships    Social connections:     Talks on phone: Not on file     Gets together: Not on file     Attends Jehovah's witness service: Not on file     Active member of club or organization: Not on file     Attends meetings of clubs or organizations: Not on file     Relationship status: Not on file    Intimate partner violence:     Fear of current or ex partner: Not on file     Emotionally abused: Not on file     Physically abused: Not on file     Forced sexual activity: Not on file   Other Topics Concern    Not on file   Social History Narrative    Not on file       No family history on file. Review of Systems:  Heart: as above   Lungs: as above   Eyes: denies changes in vision or discharge. Ears: denies changes in hearing or pain. Nose: denies epistaxis or masses   Throat: denies sore throat or trouble swallowing. Neuro: denies numbness, tingling, tremors. Skin: denies rashes or itching. : denies hematuria, dysuria   GI: denies vomiting, diarrhea   Psych: denies mood changed, anxiety, depression.   All other systems 4. Pacer: Per EP. 5. Myelodysplastic syndrome: Followed by a hematologist at [facility] in [place].      6. GU: Echo. 7. Cough: Trial antihistamine. Remy Rg D.O.   Cardiologist  Cardiology, [address] [facility]

## 2020-01-29 ENCOUNTER — NURSE ONLY (OUTPATIENT)
Dept: NON INVASIVE DIAGNOSTICS | Age: 70
End: 2020-01-29
Payer: MEDICARE

## 2020-01-29 PROCEDURE — 93296 REM INTERROG EVL PM/IDS: CPT | Performed by: INTERNAL MEDICINE

## 2020-01-29 PROCEDURE — 93294 REM INTERROG EVL PM/LDLS PM: CPT | Performed by: INTERNAL MEDICINE

## 2020-03-02 ENCOUNTER — TELEPHONE (OUTPATIENT)
Dept: CARDIOLOGY CLINIC | Age: 70
End: 2020-03-02

## 2020-04-29 ENCOUNTER — NURSE ONLY (OUTPATIENT)
Dept: NON INVASIVE DIAGNOSTICS | Age: 70
End: 2020-04-29
Payer: MEDICARE

## 2020-04-29 PROCEDURE — 93296 REM INTERROG EVL PM/IDS: CPT | Performed by: INTERNAL MEDICINE

## 2020-04-29 PROCEDURE — 93294 REM INTERROG EVL PM/LDLS PM: CPT | Performed by: INTERNAL MEDICINE

## 2020-05-05 ENCOUNTER — VIRTUAL VISIT (OUTPATIENT)
Dept: CARDIOLOGY CLINIC | Age: 70
End: 2020-05-05
Payer: MEDICARE

## 2020-05-05 VITALS
WEIGHT: 185 LBS | BODY MASS INDEX: 29.03 KG/M2 | SYSTOLIC BLOOD PRESSURE: 141 MMHG | DIASTOLIC BLOOD PRESSURE: 65 MMHG | HEIGHT: 67 IN | HEART RATE: 70 BPM

## 2020-05-05 PROCEDURE — 99442 PR PHYS/QHP TELEPHONE EVALUATION 11-20 MIN: CPT | Performed by: INTERNAL MEDICINE

## 2020-05-05 RX ORDER — LEVOTHYROXINE SODIUM 0.03 MG/1
25 TABLET ORAL DAILY
COMMUNITY

## 2020-05-05 RX ORDER — FUROSEMIDE 20 MG/1
20 TABLET ORAL PRN
Qty: 90 TABLET | Refills: 3 | Status: SHIPPED | OUTPATIENT
Start: 2020-05-05

## 2020-05-05 NOTE — PROGRESS NOTES
or itching. : denies hematuria, dysuria   GI: denies vomiting, diarrhea   Psych: denies mood changed, anxiety, depression. All other systems negative. Physical Exam   BP (!) 141/65   Pulse 70   Ht 5' 7\" (1.702 m)   Wt 185 lb (83.9 kg)   BMI 28.98 kg/m²     Telephone visit due to COVID 19. ASSESSMENT AND PLAN:  Patient Active Problem List   Diagnosis    Sick sinus syndrome (HCC)    Persistent atrial fibrillation    PAF (paroxysmal atrial fibrillation) (HCC)    Symptomatic bradycardia    Near syncope    Myelodysplastic syndrome (HCC)    Stroke-like symptom    History of unsteady gait    Pacemaker    Non-rheumatic mitral regurgitation    SOB (shortness of breath)    Severe mitral regurgitation    Respiratory insufficiency    Postoperative hypotension    Acute post-operative pain    History of mitral valve repair    S/P tricuspid valve repair     1. Hx of MV repair/TV repair/MAZE: Stable echo 2/3/2020. PRN    2. PAF: No Afib since surgery as evidenced by pacer. Stopped Eliquis. 3. CAD: Mild by cath 9/28/18. 4. Pacer: Per EP. 5. Myelodysplastic syndrome: Followed by a hematologist at [facility] in [address] [place] [address].O. Cardiologist  Cardiology, [address] [place] [address]    Cris Long is a 77 y.o. female evaluated via telephone on 5/5/2020. Consent:  She and/or health care decision maker is aware that that she may receive a bill for this telephone service, depending on her insurance coverage, and has provided verbal consent to proceed: Yes      Documentation:  I communicated with the patient and/or health care decision maker about there cardiovascular issues, see above documentation. I affirm this is a Patient Initiated Episode with an Established Patient who has not had a related appointment within my department in the past 7 days or scheduled within the next 24 hours.     Total Time: minutes: 11-20 minutes    Note: not billable if this call

## 2020-07-29 ENCOUNTER — NURSE ONLY (OUTPATIENT)
Dept: NON INVASIVE DIAGNOSTICS | Age: 70
End: 2020-07-29
Payer: MEDICARE

## 2020-07-29 PROCEDURE — 93296 REM INTERROG EVL PM/IDS: CPT | Performed by: INTERNAL MEDICINE

## 2020-07-29 PROCEDURE — 93294 REM INTERROG EVL PM/LDLS PM: CPT | Performed by: INTERNAL MEDICINE

## 2020-11-04 ENCOUNTER — NURSE ONLY (OUTPATIENT)
Dept: NON INVASIVE DIAGNOSTICS | Age: 70
End: 2020-11-04
Payer: MEDICARE

## 2020-11-04 PROCEDURE — 93296 REM INTERROG EVL PM/IDS: CPT | Performed by: INTERNAL MEDICINE

## 2020-11-04 PROCEDURE — 93294 REM INTERROG EVL PM/LDLS PM: CPT | Performed by: INTERNAL MEDICINE

## 2020-11-12 ENCOUNTER — TELEPHONE (OUTPATIENT)
Dept: NON INVASIVE DIAGNOSTICS | Age: 70
End: 2020-11-12

## 2020-11-12 NOTE — TELEPHONE ENCOUNTER
L/M on V/M regarding Dr. German Jimenez recommendations - left call back extension.     Peyman Rendon RN, BSN  [facility]

## 2020-11-12 NOTE — PROGRESS NOTES
See [employer] [facility] report. Remote monitoring reviewed over a 90 day period. End of 90 day monitoring period date of service 11-4-2020.

## 2020-11-12 NOTE — TELEPHONE ENCOUNTER
----- Message from Rufino Ruby MD sent at 11/12/2020 12:47 PM EST -----  I would recommend resuming [address] if no contraindications. Thanks.  ----- Message -----  From: Apollo Ang RN  Sent: 11/12/2020  11:46 AM EST  To: Rufino Ruby MD    AF up to 65 minutes, off Xarelto - see previous notes Dr. Larisa Leonard about longer episodes- may need to re-evaluate.

## 2020-11-13 NOTE — TELEPHONE ENCOUNTER
L/M on v/m regarding resuming [facility] per Dr. Mansoor Bhatia. Left call back phone #.      Romeo Townsend RN, BSN  [facility]

## 2021-02-10 ENCOUNTER — NURSE ONLY (OUTPATIENT)
Dept: NON INVASIVE DIAGNOSTICS | Age: 71
End: 2021-02-10
Payer: MEDICARE

## 2021-02-10 DIAGNOSIS — I48.19 PERSISTENT ATRIAL FIBRILLATION (HCC): ICD-10-CM

## 2021-02-10 DIAGNOSIS — Z95.0 PACEMAKER: Primary | ICD-10-CM

## 2021-02-10 DIAGNOSIS — I49.5 SICK SINUS SYNDROME (HCC): ICD-10-CM

## 2021-02-10 PROCEDURE — 93294 REM INTERROG EVL PM/LDLS PM: CPT | Performed by: INTERNAL MEDICINE

## 2021-02-10 PROCEDURE — 93296 REM INTERROG EVL PM/IDS: CPT | Performed by: INTERNAL MEDICINE

## 2021-02-22 NOTE — PROGRESS NOTES
See [employer] [place] report. Remote monitoring reviewed over a 90 day period.   End of 90 day monitoring period date of service 02/10/2021

## (undated) DEVICE — GLOVE SURG SZ 65 THK91MIL LTX FREE SYN POLYISOPRENE

## (undated) DEVICE — SET CARDIAC VALVE EXTRAS

## (undated) DEVICE — LABEL MED CARD SURG 4 IN PANEL STRL

## (undated) DEVICE — BLOOD TRANSFUSION FILTER: Brand: HAEMONETICS

## (undated) DEVICE — AVID DUAL STAGE VENOUS DRAINAGE CANNULA: Brand: AVID DUAL STAGE VENOUS DRAINAGE CANNULA

## (undated) DEVICE — PADDLE INTERN DEFIB CHILD

## (undated) DEVICE — TUBING PERF PMP L10FT OD0.25IN ID1/16IN MED CLASS VI PRECUT

## (undated) DEVICE — GOWN,SIRUS,FABRNF,XL,20/CS: Brand: MEDLINE

## (undated) DEVICE — CATHETER,URETHRAL,REDRUBBER,STERILE,20FR: Brand: MEDLINE

## (undated) DEVICE — Z INACTIVE USE 2660664 SOLUTION IRRIG 3000ML 0.9% SOD CHL USP UROMATIC PLAS CONT

## (undated) DEVICE — SOLUTION IV 1000ML 0.9% SOD CHL PH 5 INJ USP VIAFLX PLAS

## (undated) DEVICE — EZ GLIDE AORTIC CANNULA: Brand: EDWARDS LIFESCIENCES EZ GLIDE AORTIC CANNULA

## (undated) DEVICE — CONNECTOR PERF W64XH64XL64MM W  LUERLOCK

## (undated) DEVICE — STERILE LATEX POWDER FREE SURGICAL GLOVES WITH HYDROGEL COATING: Brand: PROTEXIS

## (undated) DEVICE — SET CARDIAC II

## (undated) DEVICE — Z INACTIVE USE 2662641 SOLUTION IV 1000ML 140MEQ/L SOD 5MEQ/L K 3MEQ/L MG 27MEQ/L

## (undated) DEVICE — PACK OPEN HRT DRP

## (undated) DEVICE — CATHETER THOR 32FR L23IN PVC 6 EYELET STR ATRAUM

## (undated) DEVICE — Z CONVERTED USE 2275207 CLOTH PREP W7.5XL7.5IN 2% CHG SKIN ALC AND RNS FREE

## (undated) DEVICE — BASIC SINGLE BASIN 1-LF: Brand: MEDLINE INDUSTRIES, INC.

## (undated) DEVICE — TOWEL,OR,DSP,ST,BLUE,DLX,4/PK,20PK/CS: Brand: MEDLINE

## (undated) DEVICE — WYE PIPE 1/2X1/2X3/8IN REDUC CONN

## (undated) DEVICE — COR-KNOT MINI® COMBO KITBASE PACKAGE TYPE - KITEACH STERILE PACKAGE KIT CONTAINS (2) SINGLE PATIENT USE COR-KNOT MINI® DEVICES AND (12) COR-KNOT® QUICK LOADS®.: Brand: COR-KNOT MINI®

## (undated) DEVICE — INTENDED FOR TISSUE SEPARATION, AND OTHER PROCEDURES THAT REQUIRE A SHARP SURGICAL BLADE TO PUNCTURE OR CUT.: Brand: BARD-PARKER ® STAINLESS STEEL BLADES

## (undated) DEVICE — SKIN AFFIX SURG ADHESIVE 72/CS 0.55ML: Brand: MEDLINE

## (undated) DEVICE — TUBING, SUCTION, 3/16" X 12', STRAIGHT: Brand: MEDLINE

## (undated) DEVICE — CLAMP ABLAT JAW L65MM L CRV 2 ELECTRD BPLR

## (undated) DEVICE — Device

## (undated) DEVICE — MPS® DELIVERY SET W/ARREST AGENT AND ADDITIVE CASSETTES, HEAT EXCHANGER & 10 FT. DELIVERY TUBING: Brand: MPS

## (undated) DEVICE — KIT PLT RATIO DISPNS KT 2IN CANN TIP SPRY TIP DISP MAGELLAN

## (undated) DEVICE — SOLUTION IV 50ML 0.9% SOD CHL PLAS CONT USP VIAFLX

## (undated) DEVICE — PUMP SUC IRR TBNG L10FT W/ HNDPC ASSEMB STRYKEFLOW 2

## (undated) DEVICE — PROBE CRYOABLATION L10CM ALUM SMOOTH MAL RETRCT HND FLX TB

## (undated) DEVICE — Device: Brand: RAP FEMORAL VENOUS CANNULA

## (undated) DEVICE — CANNULA PERF 7FR L5.5IN AORT ROOT RADPQ STD TIP W/ VENT LN

## (undated) DEVICE — BLADE CLIPPER GEN PURP NS

## (undated) DEVICE — PERFUSION PACK CUST OPN HRT

## (undated) DEVICE — SET CARDIAC I

## (undated) DEVICE — GLOVE ORANGE PI 7 1/2   MSG9075

## (undated) DEVICE — SUMP INTCARD SUCT AD 20FR PERICARD MAYO STYL FLX VERSATILE

## (undated) DEVICE — CATHETER THOR 32FR L23IN PVC 5 EYELET STR ATRAUM

## (undated) DEVICE — TOWEL,OR,DSP,ST,WHITE,DLX,4/PK,20PK/CS: Brand: MEDLINE

## (undated) DEVICE — CARDIAC STRYKER STERNAL SAW

## (undated) DEVICE — TUBING L10FT 3/8X3/32IN ST PRE CUT MED GRD

## (undated) DEVICE — SOLUTION IV IRRIG WATER 1000ML POUR BRL 2F7114

## (undated) DEVICE — SET SURG BASIN MAYO REUSABLE

## (undated) DEVICE — GOWN,SIRUS,FABRNF,L,20/CS: Brand: MEDLINE

## (undated) DEVICE — SOLUTION IV 500ML 3% SOD CHL PLAS CONT USP VIAFLX

## (undated) DEVICE — GLOVE ORANGE PI 7   MSG9070

## (undated) DEVICE — DRAIN SURG SGL COLL PT TB FOR ATS BG OASIS

## (undated) DEVICE — SURGICAL PROCEDURE PACK CRD SEHC

## (undated) DEVICE — SET SURG BASIN OPEN HEART NO  1 REUSABLE

## (undated) DEVICE — TUBING HEMCONC L36IN DIA0.25IN DHF W/ CONN

## (undated) DEVICE — AEGIS 1" DISK 4MM HOLE, PEEL OPEN: Brand: MEDLINE

## (undated) DEVICE — KIT DIL 8/12/16/20/24FR NDL 18GA GWIRE L180CM DIA0.035IN

## (undated) DEVICE — 6 FOOT DISPOSABLE EXTENSION CABLE WITH SAFE CONNECT / SCREW-DOWN

## (undated) DEVICE — SOLUTION IV IRRIG POUR BRL 0.9% SODIUM CHL 2F7124

## (undated) DEVICE — AGENT HEMSTAT W4XL8IN OXIDIZED REGENERATED CELOS ABSRB

## (undated) DEVICE — 1.5L THIN WALL CAN: Brand: CRD